# Patient Record
Sex: MALE | Race: WHITE | Employment: OTHER | URBAN - METROPOLITAN AREA
[De-identification: names, ages, dates, MRNs, and addresses within clinical notes are randomized per-mention and may not be internally consistent; named-entity substitution may affect disease eponyms.]

---

## 2018-02-22 ENCOUNTER — HOSPITAL ENCOUNTER (OUTPATIENT)
Dept: PREADMISSION TESTING | Age: 78
Discharge: HOME | End: 2018-02-22
Payer: MEDICARE

## 2018-02-22 VITALS
OXYGEN SATURATION: 100 % | SYSTOLIC BLOOD PRESSURE: 156 MMHG | HEIGHT: 71 IN | BODY MASS INDEX: 22.12 KG/M2 | TEMPERATURE: 97.8 F | DIASTOLIC BLOOD PRESSURE: 70 MMHG | HEART RATE: 58 BPM | WEIGHT: 158 LBS

## 2018-02-22 LAB
ABO + RH BLD: NORMAL
BLOOD GROUP ANTIBODIES SERPL: NORMAL
SPECIMEN EXP DATE BLD: NORMAL

## 2018-02-22 PROCEDURE — 36415 COLL VENOUS BLD VENIPUNCTURE: CPT | Performed by: ORTHOPAEDIC SURGERY

## 2018-02-22 PROCEDURE — 86900 BLOOD TYPING SEROLOGIC ABO: CPT | Performed by: ORTHOPAEDIC SURGERY

## 2018-02-22 RX ORDER — PRAVASTATIN SODIUM 40 MG/1
20 TABLET ORAL DAILY
COMMUNITY

## 2018-02-22 RX ORDER — ASPIRIN 325 MG
325 TABLET ORAL DAILY
COMMUNITY
End: 2018-03-02

## 2018-02-22 RX ORDER — HYDROCODONE BITARTRATE AND ACETAMINOPHEN 10; 325 MG/1; MG/1
1 TABLET ORAL
COMMUNITY
End: 2018-03-02

## 2018-02-22 RX ORDER — CYANOCOBALAMIN 1000 UG/ML
1000 INJECTION, SOLUTION INTRAMUSCULAR; SUBCUTANEOUS
COMMUNITY

## 2018-02-22 RX ORDER — AMLODIPINE BESYLATE 5 MG/1
5 TABLET ORAL DAILY
COMMUNITY

## 2018-02-22 RX ORDER — TAMSULOSIN HYDROCHLORIDE 0.4 MG/1
0.4 CAPSULE ORAL AS NEEDED
COMMUNITY

## 2018-02-22 RX ORDER — CLOPIDOGREL BISULFATE 75 MG/1
75 TABLET ORAL DAILY
COMMUNITY

## 2018-02-22 RX ORDER — ALLOPURINOL 300 MG/1
300 TABLET ORAL DAILY
COMMUNITY

## 2018-02-23 LAB
BACTERIA SPEC CULT: ABNORMAL
BACTERIA SPEC CULT: ABNORMAL
SERVICE CMNT-IMP: ABNORMAL

## 2018-02-26 PROBLEM — Z22.321 MSSA (METHICILLIN-SUSCEPTIBLE STAPH AUREUS) CARRIER: Status: ACTIVE | Noted: 2018-02-26

## 2018-02-26 RX ORDER — MUPIROCIN 20 MG/G
OINTMENT TOPICAL 2 TIMES DAILY
Qty: 22 G | Refills: 0 | Status: SHIPPED | OUTPATIENT
Start: 2018-02-26 | End: 2018-03-02

## 2018-02-26 NOTE — PERIOP NOTES
FAXED AND CALLED (JEFFY) KYRA--DR SYED RE: NARES CULTURE = APPARENT STAPH AUREUS NOT MRSA. CHART FORWARDED TO ALEJANDRA NUÑEZ NP FOR FURTHER TREATMENT PER ORTHO PROTOCOL.

## 2018-02-26 NOTE — ADVANCED PRACTICE NURSE
Patient was called (identity confirmed with 2 patient identifiers) and informed of positive MSSA, instructed to obtain mupirocin prescription and Chlorhexidine liquid soap from pharmacy per protocol. Written instructions given at time of Preadmission Testing were reinforced with patient. Patient was given an opportunity to have questions answered and contact information if needed.

## 2018-02-27 ENCOUNTER — ANESTHESIA EVENT (OUTPATIENT)
Dept: SURGERY | Age: 78
DRG: 470 | End: 2018-02-27
Payer: MEDICARE

## 2018-02-27 RX ORDER — DEXAMETHASONE SODIUM PHOSPHATE 10 MG/ML
4 INJECTION INTRAMUSCULAR; INTRAVENOUS ONCE
Status: CANCELLED | OUTPATIENT
Start: 2018-02-27 | End: 2018-02-28

## 2018-02-27 RX ORDER — ACETAMINOPHEN 500 MG
1000 TABLET ORAL ONCE
Status: CANCELLED | OUTPATIENT
Start: 2018-02-27 | End: 2018-02-27

## 2018-02-27 RX ORDER — CELECOXIB 200 MG/1
200 CAPSULE ORAL ONCE
Status: CANCELLED | OUTPATIENT
Start: 2018-02-27 | End: 2018-02-27

## 2018-02-27 RX ORDER — CEFAZOLIN SODIUM/WATER 2 G/20 ML
2 SYRINGE (ML) INTRAVENOUS ONCE
Status: CANCELLED | OUTPATIENT
Start: 2018-02-27 | End: 2018-02-27

## 2018-02-28 ENCOUNTER — HOSPITAL ENCOUNTER (INPATIENT)
Age: 78
LOS: 2 days | Discharge: HOME HEALTH | DRG: 470 | End: 2018-03-02
Attending: ORTHOPAEDIC SURGERY | Admitting: ORTHOPAEDIC SURGERY
Payer: MEDICARE

## 2018-02-28 ENCOUNTER — ANESTHESIA (OUTPATIENT)
Dept: SURGERY | Age: 78
DRG: 470 | End: 2018-02-28
Payer: MEDICARE

## 2018-02-28 DIAGNOSIS — M16.12 PRIMARY LOCALIZED OSTEOARTHRITIS OF LEFT HIP: Primary | ICD-10-CM

## 2018-02-28 LAB
GLUCOSE BLD STRIP.AUTO-MCNC: 88 MG/DL (ref 65–100)
SERVICE CMNT-IMP: NORMAL

## 2018-02-28 PROCEDURE — 74011000250 HC RX REV CODE- 250

## 2018-02-28 PROCEDURE — 77030034850: Performed by: ORTHOPAEDIC SURGERY

## 2018-02-28 PROCEDURE — 77030018836 HC SOL IRR NACL ICUM -A: Performed by: ORTHOPAEDIC SURGERY

## 2018-02-28 PROCEDURE — 77030018074 HC RTVR SUT ARTH4 S&N -B: Performed by: ORTHOPAEDIC SURGERY

## 2018-02-28 PROCEDURE — 74011250636 HC RX REV CODE- 250/636

## 2018-02-28 PROCEDURE — 77030032490 HC SLV COMPR SCD KNE COVD -B

## 2018-02-28 PROCEDURE — C1776 JOINT DEVICE (IMPLANTABLE): HCPCS | Performed by: ORTHOPAEDIC SURGERY

## 2018-02-28 PROCEDURE — 77030011640 HC PAD GRND REM COVD -A: Performed by: ORTHOPAEDIC SURGERY

## 2018-02-28 PROCEDURE — 77030018846 HC SOL IRR STRL H20 ICUM -A: Performed by: ORTHOPAEDIC SURGERY

## 2018-02-28 PROCEDURE — 77030007866 HC KT SPN ANES BBMI -B: Performed by: ANESTHESIOLOGY

## 2018-02-28 PROCEDURE — 77030031139 HC SUT VCRL2 J&J -A: Performed by: ORTHOPAEDIC SURGERY

## 2018-02-28 PROCEDURE — 65270000029 HC RM PRIVATE

## 2018-02-28 PROCEDURE — 74011000258 HC RX REV CODE- 258: Performed by: ORTHOPAEDIC SURGERY

## 2018-02-28 PROCEDURE — C9290 INJ, BUPIVACAINE LIPOSOME: HCPCS | Performed by: ORTHOPAEDIC SURGERY

## 2018-02-28 PROCEDURE — 82962 GLUCOSE BLOOD TEST: CPT

## 2018-02-28 PROCEDURE — 77030020788: Performed by: ORTHOPAEDIC SURGERY

## 2018-02-28 PROCEDURE — 77030027138 HC INCENT SPIROMETER -A

## 2018-02-28 PROCEDURE — 76060000036 HC ANESTHESIA 2.5 TO 3 HR: Performed by: ORTHOPAEDIC SURGERY

## 2018-02-28 PROCEDURE — 77030012935 HC DRSG AQUACEL BMS -B: Performed by: ORTHOPAEDIC SURGERY

## 2018-02-28 PROCEDURE — 77030003882 HC BIT DRL TWST BRSM -B: Performed by: ORTHOPAEDIC SURGERY

## 2018-02-28 PROCEDURE — 0SRB03A REPLACEMENT OF LEFT HIP JOINT WITH CERAMIC SYNTHETIC SUBSTITUTE, UNCEMENTED, OPEN APPROACH: ICD-10-PCS | Performed by: ORTHOPAEDIC SURGERY

## 2018-02-28 PROCEDURE — 77030010507 HC ADH SKN DERMBND J&J -B: Performed by: ORTHOPAEDIC SURGERY

## 2018-02-28 PROCEDURE — 77030018547 HC SUT ETHBND1 J&J -B: Performed by: ORTHOPAEDIC SURGERY

## 2018-02-28 PROCEDURE — 77030020365 HC SOL INJ SOD CL 0.9% 50ML: Performed by: ORTHOPAEDIC SURGERY

## 2018-02-28 PROCEDURE — 74011250637 HC RX REV CODE- 250/637: Performed by: ORTHOPAEDIC SURGERY

## 2018-02-28 PROCEDURE — 74011250636 HC RX REV CODE- 250/636: Performed by: ORTHOPAEDIC SURGERY

## 2018-02-28 PROCEDURE — 77030002933 HC SUT MCRYL J&J -A: Performed by: ORTHOPAEDIC SURGERY

## 2018-02-28 PROCEDURE — 74011250636 HC RX REV CODE- 250/636: Performed by: ANESTHESIOLOGY

## 2018-02-28 PROCEDURE — 77030006835 HC BLD SAW SAG STRY -B: Performed by: ORTHOPAEDIC SURGERY

## 2018-02-28 PROCEDURE — 76210000006 HC OR PH I REC 0.5 TO 1 HR: Performed by: ORTHOPAEDIC SURGERY

## 2018-02-28 PROCEDURE — 74011000250 HC RX REV CODE- 250: Performed by: ORTHOPAEDIC SURGERY

## 2018-02-28 PROCEDURE — 76010000172 HC OR TIME 2.5 TO 3 HR INTENSV-TIER 1: Performed by: ORTHOPAEDIC SURGERY

## 2018-02-28 DEVICE — LINER ACET OD58MM ID36MM HIP ALTRX PINN: Type: IMPLANTABLE DEVICE | Site: HIP | Status: FUNCTIONAL

## 2018-02-28 DEVICE — STEM FEMORAL SUMMIT: Type: IMPLANTABLE DEVICE | Site: HIP | Status: FUNCTIONAL

## 2018-02-28 DEVICE — CUP ACET DIA58MM HIP GRIPTION PRI CEMENTLESS FIX SECT SER: Type: IMPLANTABLE DEVICE | Site: HIP | Status: FUNCTIONAL

## 2018-02-28 DEVICE — COMPONENT TOT HIP PRIMARY CERM ALTRX: Type: IMPLANTABLE DEVICE | Status: FUNCTIONAL

## 2018-02-28 DEVICE — SCREW BNE L40MM DIA6.5MM CANC HIP DOME PINN: Type: IMPLANTABLE DEVICE | Site: HIP | Status: FUNCTIONAL

## 2018-02-28 DEVICE — HEAD FEM DIA36MM +5MM OFFSET 12/14 TAPR HIP CERAMIC BIOLOX: Type: IMPLANTABLE DEVICE | Site: HIP | Status: FUNCTIONAL

## 2018-02-28 RX ORDER — ALLOPURINOL 100 MG/1
300 TABLET ORAL DAILY
Status: DISCONTINUED | OUTPATIENT
Start: 2018-03-01 | End: 2018-03-02 | Stop reason: HOSPADM

## 2018-02-28 RX ORDER — TAMSULOSIN HYDROCHLORIDE 0.4 MG/1
0.4 CAPSULE ORAL DAILY
Status: DISCONTINUED | OUTPATIENT
Start: 2018-03-01 | End: 2018-03-02 | Stop reason: HOSPADM

## 2018-02-28 RX ORDER — AMOXICILLIN 250 MG
1 CAPSULE ORAL 2 TIMES DAILY
Status: DISCONTINUED | OUTPATIENT
Start: 2018-02-28 | End: 2018-03-02 | Stop reason: HOSPADM

## 2018-02-28 RX ORDER — SODIUM CHLORIDE 0.9 % (FLUSH) 0.9 %
5-10 SYRINGE (ML) INJECTION AS NEEDED
Status: DISCONTINUED | OUTPATIENT
Start: 2018-02-28 | End: 2018-02-28 | Stop reason: HOSPADM

## 2018-02-28 RX ORDER — CELECOXIB 200 MG/1
200 CAPSULE ORAL ONCE
Status: COMPLETED | OUTPATIENT
Start: 2018-02-28 | End: 2018-02-28

## 2018-02-28 RX ORDER — FACIAL-BODY WIPES
10 EACH TOPICAL DAILY PRN
Status: DISCONTINUED | OUTPATIENT
Start: 2018-03-02 | End: 2018-03-02 | Stop reason: HOSPADM

## 2018-02-28 RX ORDER — DEXAMETHASONE SODIUM PHOSPHATE 10 MG/ML
4 INJECTION INTRAMUSCULAR; INTRAVENOUS ONCE
Status: DISCONTINUED | OUTPATIENT
Start: 2018-02-28 | End: 2018-02-28 | Stop reason: HOSPADM

## 2018-02-28 RX ORDER — DIPHENHYDRAMINE HYDROCHLORIDE 50 MG/ML
12.5 INJECTION, SOLUTION INTRAMUSCULAR; INTRAVENOUS AS NEEDED
Status: DISCONTINUED | OUTPATIENT
Start: 2018-02-28 | End: 2018-02-28 | Stop reason: HOSPADM

## 2018-02-28 RX ORDER — SODIUM CHLORIDE 0.9 % (FLUSH) 0.9 %
5-10 SYRINGE (ML) INJECTION EVERY 8 HOURS
Status: DISCONTINUED | OUTPATIENT
Start: 2018-03-01 | End: 2018-03-02 | Stop reason: HOSPADM

## 2018-02-28 RX ORDER — DEXMEDETOMIDINE HYDROCHLORIDE 4 UG/ML
INJECTION, SOLUTION INTRAVENOUS AS NEEDED
Status: DISCONTINUED | OUTPATIENT
Start: 2018-02-28 | End: 2018-02-28 | Stop reason: HOSPADM

## 2018-02-28 RX ORDER — CEFAZOLIN SODIUM/WATER 2 G/20 ML
2 SYRINGE (ML) INTRAVENOUS ONCE
Status: DISCONTINUED | OUTPATIENT
Start: 2018-02-28 | End: 2018-02-28 | Stop reason: HOSPADM

## 2018-02-28 RX ORDER — AMLODIPINE BESYLATE 5 MG/1
5 TABLET ORAL DAILY
Status: DISCONTINUED | OUTPATIENT
Start: 2018-03-01 | End: 2018-03-02 | Stop reason: HOSPADM

## 2018-02-28 RX ORDER — PROPOFOL 10 MG/ML
INJECTION, EMULSION INTRAVENOUS
Status: DISCONTINUED | OUTPATIENT
Start: 2018-02-28 | End: 2018-02-28 | Stop reason: HOSPADM

## 2018-02-28 RX ORDER — MIDAZOLAM HYDROCHLORIDE 1 MG/ML
1 INJECTION, SOLUTION INTRAMUSCULAR; INTRAVENOUS AS NEEDED
Status: DISCONTINUED | OUTPATIENT
Start: 2018-02-28 | End: 2018-02-28 | Stop reason: HOSPADM

## 2018-02-28 RX ORDER — CEFAZOLIN SODIUM IN 0.9 % NACL 2 G/100 ML
PLASTIC BAG, INJECTION (ML) INTRAVENOUS AS NEEDED
Status: DISCONTINUED | OUTPATIENT
Start: 2018-02-28 | End: 2018-02-28 | Stop reason: HOSPADM

## 2018-02-28 RX ORDER — ASPIRIN 325 MG
325 TABLET, DELAYED RELEASE (ENTERIC COATED) ORAL 2 TIMES DAILY
Status: DISCONTINUED | OUTPATIENT
Start: 2018-02-28 | End: 2018-03-02 | Stop reason: HOSPADM

## 2018-02-28 RX ORDER — HYDROXYZINE HYDROCHLORIDE 10 MG/1
10 TABLET, FILM COATED ORAL
Status: DISCONTINUED | OUTPATIENT
Start: 2018-02-28 | End: 2018-03-02 | Stop reason: HOSPADM

## 2018-02-28 RX ORDER — MIDAZOLAM HYDROCHLORIDE 1 MG/ML
INJECTION, SOLUTION INTRAMUSCULAR; INTRAVENOUS AS NEEDED
Status: DISCONTINUED | OUTPATIENT
Start: 2018-02-28 | End: 2018-02-28 | Stop reason: HOSPADM

## 2018-02-28 RX ORDER — PRAVASTATIN SODIUM 20 MG/1
20 TABLET ORAL
Status: DISCONTINUED | OUTPATIENT
Start: 2018-03-01 | End: 2018-03-02 | Stop reason: HOSPADM

## 2018-02-28 RX ORDER — DEXAMETHASONE SODIUM PHOSPHATE 4 MG/ML
INJECTION, SOLUTION INTRA-ARTICULAR; INTRALESIONAL; INTRAMUSCULAR; INTRAVENOUS; SOFT TISSUE AS NEEDED
Status: DISCONTINUED | OUTPATIENT
Start: 2018-02-28 | End: 2018-02-28 | Stop reason: HOSPADM

## 2018-02-28 RX ORDER — SODIUM CHLORIDE, SODIUM LACTATE, POTASSIUM CHLORIDE, CALCIUM CHLORIDE 600; 310; 30; 20 MG/100ML; MG/100ML; MG/100ML; MG/100ML
100 INJECTION, SOLUTION INTRAVENOUS CONTINUOUS
Status: DISCONTINUED | OUTPATIENT
Start: 2018-02-28 | End: 2018-02-28 | Stop reason: HOSPADM

## 2018-02-28 RX ORDER — ACETAMINOPHEN 500 MG
1000 TABLET ORAL ONCE
Status: COMPLETED | OUTPATIENT
Start: 2018-02-28 | End: 2018-02-28

## 2018-02-28 RX ORDER — HYDROMORPHONE HYDROCHLORIDE 1 MG/ML
0.2 INJECTION, SOLUTION INTRAMUSCULAR; INTRAVENOUS; SUBCUTANEOUS
Status: DISCONTINUED | OUTPATIENT
Start: 2018-02-28 | End: 2018-02-28 | Stop reason: HOSPADM

## 2018-02-28 RX ORDER — TRANEXAMIC ACID 100 MG/ML
2 INJECTION, SOLUTION INTRAVENOUS ONCE
Status: COMPLETED | OUTPATIENT
Start: 2018-02-28 | End: 2018-02-28

## 2018-02-28 RX ORDER — HYDROCODONE BITARTRATE AND ACETAMINOPHEN 7.5; 325 MG/1; MG/1
1 TABLET ORAL
Status: DISCONTINUED | OUTPATIENT
Start: 2018-02-28 | End: 2018-03-01

## 2018-02-28 RX ORDER — NALOXONE HYDROCHLORIDE 0.4 MG/ML
0.4 INJECTION, SOLUTION INTRAMUSCULAR; INTRAVENOUS; SUBCUTANEOUS AS NEEDED
Status: DISCONTINUED | OUTPATIENT
Start: 2018-02-28 | End: 2018-03-02 | Stop reason: HOSPADM

## 2018-02-28 RX ORDER — FENTANYL CITRATE 50 UG/ML
50 INJECTION, SOLUTION INTRAMUSCULAR; INTRAVENOUS AS NEEDED
Status: DISCONTINUED | OUTPATIENT
Start: 2018-02-28 | End: 2018-02-28 | Stop reason: HOSPADM

## 2018-02-28 RX ORDER — MORPHINE SULFATE 10 MG/ML
2 INJECTION, SOLUTION INTRAMUSCULAR; INTRAVENOUS
Status: DISCONTINUED | OUTPATIENT
Start: 2018-02-28 | End: 2018-02-28 | Stop reason: HOSPADM

## 2018-02-28 RX ORDER — SODIUM CHLORIDE 9 MG/ML
125 INJECTION, SOLUTION INTRAVENOUS CONTINUOUS
Status: DISPENSED | OUTPATIENT
Start: 2018-02-28 | End: 2018-03-01

## 2018-02-28 RX ORDER — SODIUM CHLORIDE 0.9 % (FLUSH) 0.9 %
5-10 SYRINGE (ML) INJECTION AS NEEDED
Status: DISCONTINUED | OUTPATIENT
Start: 2018-02-28 | End: 2018-03-02 | Stop reason: HOSPADM

## 2018-02-28 RX ORDER — BUPIVACAINE HYDROCHLORIDE 7.5 MG/ML
INJECTION, SOLUTION EPIDURAL; RETROBULBAR AS NEEDED
Status: DISCONTINUED | OUTPATIENT
Start: 2018-02-28 | End: 2018-02-28 | Stop reason: HOSPADM

## 2018-02-28 RX ORDER — MIDAZOLAM HYDROCHLORIDE 1 MG/ML
0.5 INJECTION, SOLUTION INTRAMUSCULAR; INTRAVENOUS
Status: DISCONTINUED | OUTPATIENT
Start: 2018-02-28 | End: 2018-02-28 | Stop reason: HOSPADM

## 2018-02-28 RX ORDER — HYDROMORPHONE HYDROCHLORIDE 1 MG/ML
0.5 INJECTION, SOLUTION INTRAMUSCULAR; INTRAVENOUS; SUBCUTANEOUS
Status: ACTIVE | OUTPATIENT
Start: 2018-02-28 | End: 2018-03-01

## 2018-02-28 RX ORDER — FENTANYL CITRATE 50 UG/ML
25 INJECTION, SOLUTION INTRAMUSCULAR; INTRAVENOUS
Status: DISCONTINUED | OUTPATIENT
Start: 2018-02-28 | End: 2018-02-28 | Stop reason: HOSPADM

## 2018-02-28 RX ORDER — CEFAZOLIN SODIUM/WATER 2 G/20 ML
2 SYRINGE (ML) INTRAVENOUS EVERY 8 HOURS
Status: COMPLETED | OUTPATIENT
Start: 2018-02-28 | End: 2018-03-01

## 2018-02-28 RX ORDER — ONDANSETRON 2 MG/ML
4 INJECTION INTRAMUSCULAR; INTRAVENOUS
Status: ACTIVE | OUTPATIENT
Start: 2018-02-28 | End: 2018-03-01

## 2018-02-28 RX ORDER — POLYETHYLENE GLYCOL 3350 17 G/17G
17 POWDER, FOR SOLUTION ORAL DAILY
Status: DISCONTINUED | OUTPATIENT
Start: 2018-03-01 | End: 2018-03-02 | Stop reason: HOSPADM

## 2018-02-28 RX ORDER — ROPIVACAINE HYDROCHLORIDE 5 MG/ML
150 INJECTION, SOLUTION EPIDURAL; INFILTRATION; PERINEURAL AS NEEDED
Status: DISCONTINUED | OUTPATIENT
Start: 2018-02-28 | End: 2018-02-28 | Stop reason: HOSPADM

## 2018-02-28 RX ORDER — ACETAMINOPHEN 500 MG
500 TABLET ORAL EVERY 4 HOURS
Status: DISCONTINUED | OUTPATIENT
Start: 2018-02-28 | End: 2018-03-02 | Stop reason: HOSPADM

## 2018-02-28 RX ORDER — CLOPIDOGREL BISULFATE 75 MG/1
75 TABLET ORAL DAILY
Status: DISCONTINUED | OUTPATIENT
Start: 2018-03-01 | End: 2018-03-02 | Stop reason: HOSPADM

## 2018-02-28 RX ORDER — HYDROCODONE BITARTRATE AND ACETAMINOPHEN 10; 325 MG/1; MG/1
1 TABLET ORAL
Status: DISCONTINUED | OUTPATIENT
Start: 2018-02-28 | End: 2018-03-01

## 2018-02-28 RX ORDER — GLYCOPYRROLATE 0.2 MG/ML
INJECTION INTRAMUSCULAR; INTRAVENOUS AS NEEDED
Status: DISCONTINUED | OUTPATIENT
Start: 2018-02-28 | End: 2018-02-28 | Stop reason: HOSPADM

## 2018-02-28 RX ORDER — SODIUM CHLORIDE 0.9 % (FLUSH) 0.9 %
5-10 SYRINGE (ML) INJECTION EVERY 8 HOURS
Status: DISCONTINUED | OUTPATIENT
Start: 2018-02-28 | End: 2018-02-28 | Stop reason: HOSPADM

## 2018-02-28 RX ORDER — LIDOCAINE HYDROCHLORIDE 10 MG/ML
0.1 INJECTION, SOLUTION EPIDURAL; INFILTRATION; INTRACAUDAL; PERINEURAL AS NEEDED
Status: DISCONTINUED | OUTPATIENT
Start: 2018-02-28 | End: 2018-02-28 | Stop reason: HOSPADM

## 2018-02-28 RX ORDER — PROPOFOL 10 MG/ML
INJECTION, EMULSION INTRAVENOUS AS NEEDED
Status: DISCONTINUED | OUTPATIENT
Start: 2018-02-28 | End: 2018-02-28 | Stop reason: HOSPADM

## 2018-02-28 RX ADMIN — ASPIRIN 325 MG: 325 TABLET, DELAYED RELEASE ORAL at 19:40

## 2018-02-28 RX ADMIN — PROPOFOL 40 MG: 10 INJECTION, EMULSION INTRAVENOUS at 14:43

## 2018-02-28 RX ADMIN — ACETAMINOPHEN 500 MG: 500 TABLET, FILM COATED ORAL at 23:34

## 2018-02-28 RX ADMIN — SODIUM CHLORIDE, POTASSIUM CHLORIDE, SODIUM LACTATE AND CALCIUM CHLORIDE: 600; 310; 30; 20 INJECTION, SOLUTION INTRAVENOUS at 14:04

## 2018-02-28 RX ADMIN — BUPIVACAINE HYDROCHLORIDE 14 MG: 7.5 INJECTION, SOLUTION EPIDURAL; RETROBULBAR at 14:20

## 2018-02-28 RX ADMIN — PROPOFOL 30 MG: 10 INJECTION, EMULSION INTRAVENOUS at 14:39

## 2018-02-28 RX ADMIN — ACETAMINOPHEN 1000 MG: 500 TABLET, FILM COATED ORAL at 13:36

## 2018-02-28 RX ADMIN — MIDAZOLAM HYDROCHLORIDE 1 MG: 1 INJECTION, SOLUTION INTRAMUSCULAR; INTRAVENOUS at 14:15

## 2018-02-28 RX ADMIN — GLYCOPYRROLATE 0.2 MG: 0.2 INJECTION INTRAMUSCULAR; INTRAVENOUS at 14:44

## 2018-02-28 RX ADMIN — CELECOXIB 200 MG: 200 CAPSULE ORAL at 13:36

## 2018-02-28 RX ADMIN — SODIUM CHLORIDE 125 ML/HR: 900 INJECTION, SOLUTION INTRAVENOUS at 23:34

## 2018-02-28 RX ADMIN — Medication 2 G: at 21:34

## 2018-02-28 RX ADMIN — SODIUM CHLORIDE, POTASSIUM CHLORIDE, SODIUM LACTATE AND CALCIUM CHLORIDE: 600; 310; 30; 20 INJECTION, SOLUTION INTRAVENOUS at 15:26

## 2018-02-28 RX ADMIN — DEXMEDETOMIDINE HYDROCHLORIDE 4 MCG: 4 INJECTION, SOLUTION INTRAVENOUS at 14:28

## 2018-02-28 RX ADMIN — Medication 2 G: at 14:22

## 2018-02-28 RX ADMIN — ACETAMINOPHEN 500 MG: 500 TABLET, FILM COATED ORAL at 19:40

## 2018-02-28 RX ADMIN — DEXMEDETOMIDINE HYDROCHLORIDE 4 MCG: 4 INJECTION, SOLUTION INTRAVENOUS at 14:37

## 2018-02-28 RX ADMIN — DEXAMETHASONE SODIUM PHOSPHATE 4 MG: 4 INJECTION, SOLUTION INTRA-ARTICULAR; INTRALESIONAL; INTRAMUSCULAR; INTRAVENOUS; SOFT TISSUE at 14:29

## 2018-02-28 RX ADMIN — PROPOFOL 30 MG: 10 INJECTION, EMULSION INTRAVENOUS at 14:37

## 2018-02-28 RX ADMIN — PROPOFOL 25 MCG/KG/MIN: 10 INJECTION, EMULSION INTRAVENOUS at 14:16

## 2018-02-28 RX ADMIN — MIDAZOLAM HYDROCHLORIDE 1 MG: 1 INJECTION, SOLUTION INTRAMUSCULAR; INTRAVENOUS at 14:24

## 2018-02-28 RX ADMIN — PROPOFOL 40 MG: 10 INJECTION, EMULSION INTRAVENOUS at 14:34

## 2018-02-28 RX ADMIN — DOCUSATE SODIUM AND SENNOSIDES 1 TABLET: 8.6; 5 TABLET, FILM COATED ORAL at 19:40

## 2018-02-28 NOTE — BRIEF OP NOTE
BRIEF OPERATIVE NOTE    Date of Procedure: 2/28/2018   Preoperative Diagnosis: PRIMARY LOCALIZED OSTEOARTHRITIS LEFT HIP  Postoperative Diagnosis: PRIMARY LOCALIZED OSTEOARTHRITIS LEFT HIP    Procedure(s):  LEFT TOTAL HIP ARTHROPLASTY    Surgeon(s) and Role:     * Raudel Weeks MD - Primary         Assistant Staff: None      Surgical Staff:  Circ-1: Sharon Moran RN  Circ-Relief: Greg Casas RN  Scrub Tech-1: Abdulaziz Pastrana  Surg Asst-1: Ga Mora; Jacinto Rosales  Surg Asst-2: Marietta Naidu  Event Time In   Incision Start 1459   Incision Close      Anesthesia: Spinal   Estimated Blood Loss: 250  Specimens: * No specimens in log *   Findings: severe djd   Complications: none  Implants:   Implant Name Type Inv.  Item Serial No.  Lot No. LRB No. Used Action   CUP ACET SECTOR GRIPTION 58MM -- TI - SN/A  CUP ACET SECTOR GRIPTION 58MM -- TI N/A [facility] [employer] [id number] Left 1 Implanted   LINER ACET PINN NEUT [id number] -- ALTRX - SN/A  LINER ACET PINN NEUT [id number] -- ALTRX N/A [facility] [employer] [id number] Left 1 Implanted   STEM FEM 12/14 TAPR 7 -- SUMMIT - SN/A  STEM FEM 12/14 TAPR 7 -- SUMMIT N/A [facility] [employer] [id number] Left 1 Implanted   SCR BNE ACET CANC PINN 6.5X40 -- SS - SN/A  SCR BNE ACET CANC PINN 6.5X40 -- SS N/A [facility] [employer] [id number] Left 1 Implanted   HEAD FEM S-ROM 36MM +5MM NK -- BIOLOX DELTA - SN/A   HEAD FEM S-ROM 36MM +5MM NK -- BIOLOX DELTA N/A [facility] [employer] [id number] Left 1 Implanted

## 2018-02-28 NOTE — PERIOP NOTES
TRANSFER - OUT REPORT:    Verbal report given to FLAKITO on Judith Dee  being transferred to [address] [place] [address] for routine progression of care       Report consisted of patients Situation, Background, Assessment and   Recommendations(SBAR). Time Pre op antibiotic given:1422  Anesthesia Stop time: 8267  Hogan Present on Transfer to floor:y  Order for Hogan on Chart:y  Discharge Prescriptions with Chart:n    Information from the following report(s) SBAR, Kardex, OR Summary, Procedure Summary, Intake/Output, MAR, Recent Results and Med Rec Status was reviewed with the receiving nurse. Opportunity for questions and clarification was provided. Is the patient on 02? NO       L/Min        Other     Is the patient on a monitor? NO    Is the nurse transporting with the patient? NO    Surgical Waiting Area notified of patient's transfer from PACU?  YES      The following personal items collected during your admission accompanied patient upon transfer:   Dental Appliance: Dental Appliances: Partials  Vision:    Hearing Aid:    Jewelry:    Clothing:    Other Valuables:    Valuables sent to safe:

## 2018-02-28 NOTE — IP AVS SNAPSHOT
[facility] [address] 
[phone] Patient: Lalit Cohen MRN: [id number] :1940 A check daniel indicates which time of day the medication should be taken. My Medications START taking these medications Instructions Each Dose to Equal  
 Morning Noon Evening Bedtime  
 acetaminophen 500 mg tablet Commonly known as:  TYLENOL Your last dose was: Your next dose is: Take 1 Tab by mouth every four (4) hours (while awake). Schedule for pain control over the next 7-14 days. Do not exceed 2500 mg in 24 hours. Obtain over the counter. Indications: Postoperative Hip Incisional Pain 500 mg  
    
   
   
   
  
 aspirin delayed-release 325 mg tablet Replaces:  aspirin 325 mg tablet Your last dose was: Your next dose is: Take 1 Tab by mouth two (2) times a day. Take either Enteric Coated or Delayed Release Aspirin. May obtain over-the-counter. Indications: Prevention of Blood Clots after Total Hip Replacement 325 mg HYDROcodone-acetaminophen 5-325 mg per tablet Commonly known as:  Rashida Fabianamage Replaces:  HYDROcodone-acetaminophen  mg tablet Your last dose was: Your next dose is: Take 0.5 Tabs by mouth every twelve (12) hours as needed for Pain. Max Daily Amount: 1 Tab. Indications: Severe Incisional Hip Pain  
 0.5 Tab CONTINUE taking these medications Instructions Each Dose to Equal  
 Morning Noon Evening Bedtime  
 allopurinol 300 mg tablet Commonly known as:  Kriss Segura Your last dose was: Your next dose is: Take 300 mg by mouth daily. 300 mg  
    
   
   
   
  
 amLODIPine 5 mg tablet Commonly known as:  Carolyn Reece Your last dose was: Your next dose is: Take 5 mg by mouth daily. 5 mg clopidogrel 75 mg Tab Commonly known as:  PLAVIX Your last dose was: Your next dose is: Take 75 mg by mouth daily. 75 mg  
    
   
   
   
  
 cyanocobalamin 1,000 mcg/mL injection Commonly known as:  VITAMIN B12 Your last dose was: Your next dose is:    
   
   
 1,000 mcg by IntraMUSCular route every thirty (30) days. 1000 mcg FLOMAX 0.4 mg capsule Generic drug:  tamsulosin Your last dose was: Your next dose is: Take 0.4 mg by mouth as needed. 0.4 mg  
    
   
   
   
  
 PRAVACHOL 40 mg tablet Generic drug:  pravastatin Your last dose was: Your next dose is: Take 20 mg by mouth daily. 20 mg  
    
   
   
   
  
  
STOP taking these medications   
 aspirin 325 mg tablet Commonly known as:  ASPIRIN Replaced by:  aspirin delayed-release 325 mg tablet HYDROcodone-acetaminophen  mg tablet Commonly known as:  Brian Fraction Replaced by:  HYDROcodone-acetaminophen 5-325 mg per tablet  
   
  
 mupirocin 2 % ointment Commonly known as:  [facility] Where to Get Your Medications Information on where to get these meds will be given to you by the nurse or doctor. ! Ask your nurse or doctor about these medications  
  acetaminophen 500 mg tablet  
 aspirin delayed-release 325 mg tablet HYDROcodone-acetaminophen 5-325 mg per tablet

## 2018-02-28 NOTE — IP AVS SNAPSHOT
Summary of Care Report The Summary of Care report has been created to help improve care coordination. Users with access to [employer] or [address] (Web-based application) may access additional patient information including the Discharge Summary. If you are not currently a [address] user and need more information, please call the number listed below in the [place] [address] section and ask to be connected with Medical Records. Facility Information Name Address Phone [address] [phone] Patient Information Patient Name Sex ABELARDO Cooper ([id number]) Male 1940 Discharge Information Admitting Provider Service Area Unit Fadi Tijerina MD / [facility] / [phone] Discharge Provider Discharge Date/Time Discharge Disposition Destination (none) 3/2/2018 (Pending) [facility] (none) Patient Language Language ENGLISH [13] Hospital Problems as of 3/2/2018  Reviewed: 2018  1:37 PM by Talisha Fulton MD  
  
  
  
 Class Noted - Resolved Last Modified POA Active Problems Primary localized osteoarthritis of left hip  2018 - Present 2018 by Fadi Tijerina MD Unknown Entered by Fadi Tijerina MD  
  
Non-Hospital Problems as of 3/2/2018  Reviewed: 2018  1:37 PM by Talisha Fulton MD  
  
  
  
 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  
  
START taking these medications Dose & Instructions Dispensing Information Comments  
 acetaminophen 500 mg tablet Commonly known as:  TYLENOL  Dose:  500 mg  
 Take 1 Tab by mouth every four (4) hours (while awake). Schedule for pain control over the next 7-14 days. Do not exceed 2500 mg in 24 hours. Obtain over the counter. Indications: Postoperative Hip Incisional Pain Quantity:  60 Tab Refills:  0  
   
 aspirin delayed-release 325 mg tablet Replaces:  aspirin 325 mg tablet Dose:  325 mg Take 1 Tab by mouth two (2) times a day. Take either Enteric Coated or Delayed Release Aspirin. May obtain over-the-counter. Indications: Prevention of Blood Clots after Total Hip Replacement Quantity:  60 Tab Refills:  0 HYDROcodone-acetaminophen 5-325 mg per tablet Commonly known as:  Natty Dozier Replaces:  HYDROcodone-acetaminophen  mg tablet Dose:  0.5 Tab Take 0.5 Tabs by mouth every twelve (12) hours as needed for Pain. Max Daily Amount: 1 Tab. Indications: Severe Incisional Hip Pain Quantity:  8 Tab Refills:  0 CONTINUE these medications which have NOT CHANGED Dose & Instructions Dispensing Information Comments  
 allopurinol 300 mg tablet Commonly known as:  Kristina Bita Dose:  300 mg Take 300 mg by mouth daily. Refills:  0  
   
 amLODIPine 5 mg tablet Commonly known as:  Tylene Brayden Dose:  5 mg Take 5 mg by mouth daily. Refills:  0  
   
 clopidogrel 75 mg Tab Commonly known as:  PLAVIX Dose:  75 mg Take 75 mg by mouth daily. Refills:  0  
   
 cyanocobalamin 1,000 mcg/mL injection Commonly known as:  VITAMIN B12 Dose:  1000 mcg  
1,000 mcg by IntraMUSCular route every thirty (30) days. Refills:  0  
   
 FLOMAX 0.4 mg capsule Generic drug:  tamsulosin Dose:  0.4 mg Take 0.4 mg by mouth as needed. Refills:  0 PRAVACHOL 40 mg tablet Generic drug:  pravastatin Dose:  20 mg Take 20 mg by mouth daily. Refills:  0 STOP taking these medications Comments  
 aspirin 325 mg tablet Commonly known as:  ASPIRIN  
 Replaced by:  aspirin delayed-release 325 mg tablet HYDROcodone-acetaminophen  mg tablet Commonly known as:  Carson Daring Replaced by:  HYDROcodone-acetaminophen 5-325 mg per tablet  
   
   
 mupirocin 2 % ointment Commonly known as:  [facility] Information ID Date/Time Status Primary Surgeon All Procedures Location [id number] 2/28/2018 Robby Florence MD LEFT TOTAL HIP ARTHROPLASTY   [facility] MAIN OR Follow-up Information Follow up With Details Comments Contact Info Sylvain Galvin On 3/5/2018 For home health physical therapy and skilled nursing. [phone] Magui Fang MD   Patient can only remember the practice name and not the physician Discharge Instructions Patient meets criteria for BUNDLED PAYMENT  
for Care Improvement Initiative Criteria Contact Information for Orthopedic Nurse Navigator:     
ANTIONETTE Clancy, RN-BC 
V:[phone] R:[phone] L:[phone] After Hospital Care Plan:  Discharge Instructions Hip Replacement- Dr. Lakeshia Ventura Patient Name: Jaden Mathis Date of procedure: 2/28/2018 Procedure: Procedure(s): LEFT TOTAL HIP ARTHROPLASTY Surgeon: Surgeon(s) and Role: Shaina Brock MD - Primary PCP: Magui Fang MD 
Date of discharge: No discharge date for patient encounter. Follow up appointments ? Follow up with Dr. Lakeshia Ventura in 4 weeks. Call [phone] to make an appointment. ? If home health has been arranged for you the agency will contact you to arrange dates/times for visits. Please call them if you do not hear from them within 24 hours after you are discharged When to call your Orthopaedic Surgeon: Call [phone]. If you need to reach us after 5pm or on a weekend; call [phone] and the on call physician will be contacted ? Increased hip pain, unrelieved pain or if you have difficulty or are unable to walk ? Signs of infection-if your incision is red; continues to have drainage; drainage has a foul odor or if you have a persistent fever over 101 degrees ? Signs of a blood clot in your leg-calf pain, tenderness, redness, swelling of lower leg When to call your Primary Care Physician: 
? Concerns about medical conditions such as diabetes, high blood pressure, asthma, congestive heart failure ? Call if blood sugars are elevated, persistent headache or dizziness, coughing or congestion, constipation or diarrhea, burning with urination, abnormal heart rate When to call 911and go to the nearest emergency room 
? acute onset of chest pain, shortness of breath, difficulty breathing Activity ? Weight bearing as tolerated with walker or crutches. Refer to pages 23-33 of your handbook for instructions and pictures ? Complete your Home Exercise Program daily as instructed by your therapist.  Refer to pages 36-42 of your handbook for instructions and pictures ? Get up every one hour and walk (except at night when sleeping) ? AVOID sudden and extreme movement of your hip (surgical leg) ? Do not drive or operate heavy machinery Incision Care ? The Aquacel (brown, waterproof) surgical dressing is to remain on your hip for 7 days. On the 7th day have someone gently peel the dressing off by carefully lifting the edge and stretching it slightly to break the adhesive seal 
? If you have steri-strips (small, white pieces of tape) on your incision, they may come off when you remove the Aquacel surgical dressing. This is okay. You may now leave your incision open to air ? If your Aquacel dressing comes loose/off before the 7th day, you may replace it with a dry sterile gauze dressing; change it daily. Once your incision is not draining, you may leave it open to air ? You may take a shower with the Aquacel dressing in place.   Once the Aquacel is removed, you may shower and get your incision wet but do not submerge your incision under water in a bath tub, hot tub or swimming pool for 6 weeks after surgery. Preventing blood clots ? Take Enteric coated Aspirin (delayed release) one tablet twice a day for one month following surgery Pain management ? Take pain medication as prescribed with food and fluids; decrease the amount you use as your pain lessens ? Avoid alcoholic beverages while taking pain medication ? Please be aware that many medications contain Tylenol. We do not want you to over medicate so please read the information below as a guide. Do not take more than 2.5 Grams of Tylenol in a 24 hour period. (There are 1000 milligrams in one Gram) o Tylenol 325 mg per tablet (do not take more than 8 tablets in 24 hours) o Tylenol 500 mg per tablet (do not take more than 5 tablets in 24 hours) ? You may place an ice bag on your hip for 15-20 minutes after exercising Diet ? Resume usual diet; drink plenty of fluids; eat foods high in fiber ? You may want to take a stool softener (such as Senokot-S or Colace) to prevent constipation while you are taking pain medication. If constipation occurs, take a laxative (such as Dulcolax tablets, Milk of Magnesia, or a suppository) Home Health Care Protocol ([facility] Nursing-per physicians order ? Complete head to toe assessment, vital signs ? Medication reconciliation ? Review pain management ? Manage chronic medical conditions Physical Therapy-per physicians order Weight bearing status: 
Precautions at Admission: Fall, WBAT Left Side Weight Bearing: As tolerated Right Side Weight Bearing: Full Mobility Status: 
Supine to Sit: Supervision, Additional time Sit to Stand: Contact guard assistance, Additional time, Assist x1 (bed slightly elevated) Sit to Supine:  (returned to chair after gait) Bed to Chair: Supervision Gait: 
Distance (ft): 200 Feet (ft) Ambulation - Level of Assistance: Stand-by assistance, Additional time, Assist x1 Assistive Device: Gait belt, Walker, rolling Gait Abnormalities: Decreased step clearance, Other (chronic inversion of left foot) ADL status overall composite: Toilet Transfer : Supervision Physical Therapy ? Assessment and evaluation-bed mobility; functional transfers (bed, chair, bathroom, stairs); ambulation with equipment, car transfers, safety and ability to get out of house in the event of an emergency ? AVOID sudden and extreme movement of the hip (surgical leg) ? Discuss pain management ? Review how to do ADLs. Refer to pages 43-47 of handbook Home Exercise Program-refer to pages 36-42 of handbook Chart Review Routing History No Routing History on File

## 2018-02-28 NOTE — ANESTHESIA PROCEDURE NOTES
Spinal Block    Performed by: Deanna Ricks  Authorized by: Deanna Ricks     Pre-procedure:   Indications: primary anesthetic  Preanesthetic Checklist: patient identified, risks and benefits discussed, anesthesia consent, site marked, patient being monitored and timeout performed      Spinal Block:   Patient Position:  Seated    Prep: Betadine      Location:  L3-4  Technique:  Single shot        Needle:   Needle Type:  Pencil-tip  Needle Gauge:  25 G  Attempts:  1      Events: CSF confirmed, no blood with aspiration and no paresthesia        Assessment:  Insertion:  Uncomplicated  Patient tolerance:  Patient tolerated the procedure well with no immediate complications  14 mg

## 2018-02-28 NOTE — IP AVS SNAPSHOT
[address] 
[phone] Patient: Tripp Pena MRN: [id number] :[id number] About your hospitalization You were admitted on:  2018 [facility]  2018 Why you were hospitalized Your primary diagnosis was:  Not on File Your diagnoses also included:  Primary Localized Osteoarthritis Of Left Hip Follow-up Information Follow up With Details Comments Contact Lora Sylvain Galvin On 3/5/2018 For home health physical therapy and skilled nursing. [phone] Magui Fang, MD   Patient can only remember the practice name and not the physician Discharge Orders None A check daniel indicates which time of day the medication should be taken. My Medications START taking these medications Instructions Each Dose to Equal  
 Morning Noon Evening Bedtime  
 acetaminophen 500 mg tablet Commonly known as:  TYLENOL Your last dose was: Your next dose is: Take 1 Tab by mouth every four (4) hours (while awake). Schedule for pain control over the next 7-14 days. Do not exceed 2500 mg in 24 hours. Obtain over the counter. Indications: Postoperative Hip Incisional Pain 500 mg  
    
   
   
   
  
 aspirin delayed-release 325 mg tablet Replaces:  aspirin 325 mg tablet Your last dose was: Your next dose is: Take 1 Tab by mouth two (2) times a day. Take either Enteric Coated or Delayed Release Aspirin. May obtain over-the-counter. Indications: Prevention of Blood Clots after Total Hip Replacement 325 mg HYDROcodone-acetaminophen 5-325 mg per tablet Commonly known as:  Natty Dozier Replaces:  HYDROcodone-acetaminophen  mg tablet Your last dose was: Your next dose is: Take 0.5 Tabs by mouth every twelve (12) hours as needed for Pain. Max Daily Amount: 1 Tab. Indications: Severe Incisional Hip Pain  
 0.5 Tab CONTINUE taking these medications Instructions Each Dose to Equal  
 Morning Noon Evening Bedtime  
 allopurinol 300 mg tablet Commonly known as:  Elinor Escalona Your last dose was: Your next dose is: Take 300 mg by mouth daily. 300 mg  
    
   
   
   
  
 amLODIPine 5 mg tablet Commonly known as:  Rich Velasquez Your last dose was: Your next dose is: Take 5 mg by mouth daily. 5 mg  
    
   
   
   
  
 clopidogrel 75 mg Tab Commonly known as:  PLAVIX Your last dose was: Your next dose is: Take 75 mg by mouth daily. 75 mg  
    
   
   
   
  
 cyanocobalamin 1,000 mcg/mL injection Commonly known as:  VITAMIN B12 Your last dose was: Your next dose is:    
   
   
 1,000 mcg by IntraMUSCular route every thirty (30) days. 1000 mcg FLOMAX 0.4 mg capsule Generic drug:  tamsulosin Your last dose was: Your next dose is: Take 0.4 mg by mouth as needed. 0.4 mg  
    
   
   
   
  
 PRAVACHOL 40 mg tablet Generic drug:  pravastatin Your last dose was: Your next dose is: Take 20 mg by mouth daily. 20 mg  
    
   
   
   
  
  
STOP taking these medications   
 aspirin 325 mg tablet Commonly known as:  ASPIRIN Replaced by:  aspirin delayed-release 325 mg tablet HYDROcodone-acetaminophen  mg tablet Commonly known as:  Maira Morales Replaced by:  HYDROcodone-acetaminophen 5-325 mg per tablet  
   
  
 mupirocin 2 % ointment Commonly known as:  [facility] Where to Get Your Medications Information on where to get these meds will be given to you by the nurse or doctor. ! Ask your nurse or doctor about these medications acetaminophen 500 mg tablet  
 aspirin delayed-release 325 mg tablet HYDROcodone-acetaminophen 5-325 mg per tablet Discharge Instructions Patient meets criteria for BUNDLED PAYMENT  
for Care Improvement Initiative Criteria Contact Information for Orthopedic Nurse Navigator:     
ANTIONETTE Martínez, RN-BC 
A:[phone] E:[phone] After Hospital Care Plan:  Discharge Instructions Hip Replacement- Dr. Alexander Portillo Patient Name: Carmen Kirby Date of procedure: 2018 Procedure: Procedure(s): LEFT TOTAL HIP ARTHROPLASTY Surgeon: Surgeon(s) and Role: Benjamin Leblanc MD - Primary PCP: Magui Fang MD 
Date of discharge: No discharge date for patient encounter. Follow up appointments ? Follow up with Dr. Alexander Portlilo in 4 weeks. Call [phone] to make an appointment. ? If home health has been arranged for you the agency will contact you to arrange dates/times for visits. Please call them if you do not hear from them within 24 hours after you are discharged When to call your Orthopaedic Surgeon: Call [phone]. If you need to reach us after 5pm or on a weekend; call [phone] and the on call physician will be contacted ? Increased hip pain, unrelieved pain or if you have difficulty or are unable to walk ? Signs of infection-if your incision is red; continues to have drainage; drainage has a foul odor or if you have a persistent fever over 101 degrees ? Signs of a blood clot in your leg-calf pain, tenderness, redness, swelling of lower leg When to call your Primary Care Physician: 
? Concerns about medical conditions such as diabetes, high blood pressure, asthma, congestive heart failure ? Call if blood sugars are elevated, persistent headache or dizziness, coughing or congestion, constipation or diarrhea, burning with urination, abnormal heart rate When to call 850low go to the nearest emergency room ? acute onset of chest pain, shortness of breath, difficulty breathing Activity ? Weight bearing as tolerated with walker or crutches. Refer to pages 23-33 of your handbook for instructions and pictures ? Complete your Home Exercise Program daily as instructed by your therapist.  Refer to pages 36-42 of your handbook for instructions and pictures ? Get up every one hour and walk (except at night when sleeping) ? AVOID sudden and extreme movement of your hip (surgical leg) ? Do not drive or operate heavy machinery Incision Care ? The Aquacel (brown, waterproof) surgical dressing is to remain on your hip for 7 days. On the 7th day have someone gently peel the dressing off by carefully lifting the edge and stretching it slightly to break the adhesive seal 
? If you have steri-strips (small, white pieces of tape) on your incision, they may come off when you remove the Aquacel surgical dressing. This is okay. You may now leave your incision open to air ? If your Aquacel dressing comes loose/off before the 7th day, you may replace it with a dry sterile gauze dressing; change it daily. Once your incision is not draining, you may leave it open to air ? You may take a shower with the Aquacel dressing in place. Once the Aquacel is removed, you may shower and get your incision wet but do not submerge your incision under water in a bath tub, hot tub or swimming pool for 6 weeks after surgery. Preventing blood clots ? Take Enteric coated Aspirin (delayed release) one tablet twice a day for one month following surgery Pain management ? Take pain medication as prescribed with food and fluids; decrease the amount you use as your pain lessens ? Avoid alcoholic beverages while taking pain medication ? Please be aware that many medications contain Tylenol. We do not want you to over medicate so please read the information below as a guide.   Do not take more than 2.5 Grams of Tylenol in a 24 hour period. (There are 1000 milligrams in one Gram) o Tylenol 325 mg per tablet (do not take more than 8 tablets in 24 hours) o Tylenol 500 mg per tablet (do not take more than 5 tablets in 24 hours) ? You may place an ice bag on your hip for 15-20 minutes after exercising Diet ? Resume usual diet; drink plenty of fluids; eat foods high in fiber ? You may want to take a stool softener (such as Senokot-S or Colace) to prevent constipation while you are taking pain medication. If constipation occurs, take a laxative (such as Dulcolax tablets, Milk of Magnesia, or a suppository) Home Health Care Protocol (to be followed by [address]) Nursing-per physicians order ? Complete head to toe assessment, vital signs ? Medication reconciliation ? Review pain management ? Manage chronic medical conditions Physical Therapy-per physicians order Weight bearing status: 
Precautions at Admission: Fall, WBAT Left Side Weight Bearing: As tolerated Right Side Weight Bearing: Full Mobility Status: 
Supine to Sit: Supervision, Additional time Sit to Stand: Contact guard assistance, Additional time, Assist x1 (bed slightly elevated) Sit to Supine:  (returned to chair after gait) Bed to Chair: Supervision Gait: 
Distance (ft): 200 Feet (ft) Ambulation - Level of Assistance: Stand-by assistance, Additional time, Assist x1 Assistive Device: Gait belt, Walker, rolling Gait Abnormalities: Decreased step clearance, Other (chronic inversion of left foot) ADL status overall composite: Toilet Transfer : Supervision Physical Therapy ? Assessment and evaluation-bed mobility; functional transfers (bed, chair, bathroom, stairs); ambulation with equipment, car transfers, safety and ability to get out of house in the event of an emergency ? AVOID sudden and extreme movement of the hip (surgical leg) ? Discuss pain management ? Review how to do ADLs. Refer to pages 43-47 of handbook Home Exercise Program-refer to pages 36-42 of handbook Introducing [place]! Candis Carrillo introduces [employer] patient portal. Now you can access parts of your medical record, email your doctor's office, and request medication refills online. 1. In your internet browser, go to [email or web address] 2. Click on the First Time User? Click Here link in the Sign In box. You will see the New Member Sign Up page. 3. Enter your [employer] Access Code exactly as it appears below. You will not need to use this code after youve completed the sign-up process. If you do not sign up before the expiration date, you must request a new code. · [employer] Access Code: [email or web address] Expires: 5/23/2018  8:15 AM 
 
4. Enter the last four digits of your Social Security Number (xxxx) and Date of Birth (mm/dd/yyyy) as indicated and click Submit. You will be taken to the next sign-up page. 5. Create a [employer] ID. This will be your [employer] login ID and cannot be changed, so think of one that is secure and easy to remember. 6. Create a [employer] password. You can change your password at any time. 7. Enter your Password Reset Question and Answer. This can be used at a later time if you forget your password. 8. Enter your e-mail address. You will receive e-mail notification when new information is available in [address] 9. Click Sign Up. You can now view and download portions of your medical record. 10. Click the Download Summary menu link to download a portable copy of your medical information. If you have questions, please visit the Frequently Asked Questions section of the [employer] website. Remember, [employer] is NOT to be used for urgent needs. For medical emergencies, dial 911. Now available from your iPhone and Android! Providers Seen During Your Hospitalization Provider Specialty Primary office phone Jac Henderson MD Orthopedic Surgery [phone] Your Primary Care Physician (PCP) Primary Care Physician Office Phone Office Fax OTHER, PHYS ** None ** ** None ** You are allergic to the following No active allergies Recent Documentation Height Weight BMI Smoking Status 1.803 m 72.1 kg 22.17 kg/m2 Current Some Day Smoker Emergency Contacts Name Discharge Info Relation Home Work Mobile Belen Sutherland CAREGIVER [3] Friend [5] [phone] Patient Belongings The following personal items are in your possession at time of discharge: 
  Dental Appliances: Partials  Visual Aid: Glasses Please provide this summary of care documentation to your next provider. Signatures-by signing, you are acknowledging that this After Visit Summary has been reviewed with you and you have received a copy. Patient Signature:  ____________________________________________________________ Date:  ____________________________________________________________  
  
Deo Motta Provider Signature:  ____________________________________________________________ Date:  ____________________________________________________________

## 2018-02-28 NOTE — PROGRESS NOTES
Problem: Falls - Risk of  Goal: *Absence of Falls  Document Karlene Fall Risk and appropriate interventions in the flowsheet.    Outcome: Progressing Towards Goal  Fall Risk Interventions:  Mobility Interventions: Utilize gait belt for transfers/ambulation, Utilize walker, cane, or other assitive device, Strengthening exercises (ROM-active/passive), PT Consult for assist device competence, PT Consult for mobility concerns, OT consult for ADLs, Patient to call before getting OOB, Communicate number of staff needed for ambulation/transfer         Medication Interventions: Utilize gait belt for transfers/ambulation, Teach patient to arise slowly, Patient to call before getting OOB    Elimination Interventions: Urinal in reach, Toileting schedule/hourly rounds, Toilet paper/wipes in reach, Patient to call for help with toileting needs, Elevated toilet seat, Call light in reach (currently has a fuentes )

## 2018-02-28 NOTE — ANESTHESIA PREPROCEDURE EVALUATION
Anesthetic History   No history of anesthetic complications            Review of Systems / Medical History  Patient summary reviewed, nursing notes reviewed and pertinent labs reviewed    Pulmonary  Within defined limits                 Neuro/Psych   Within defined limits           Cardiovascular    Hypertension          CAD         GI/Hepatic/Renal  Within defined limits              Endo/Other  Within defined limits           Other Findings              Physical Exam    Airway  Mallampati: II  TM Distance: > 6 cm  Neck ROM: normal range of motion   Mouth opening: Normal     Cardiovascular  Regular rate and rhythm,  S1 and S2 normal,  no murmur, click, rub, or gallop             Dental  No notable dental hx       Pulmonary  Breath sounds clear to auscultation               Abdominal  GI exam deferred       Other Findings            Anesthetic Plan    ASA: 2  Anesthesia type: spinal          Induction: Intravenous  Anesthetic plan and risks discussed with: Patient      No txa

## 2018-03-01 ENCOUNTER — APPOINTMENT (OUTPATIENT)
Dept: GENERAL RADIOLOGY | Age: 78
DRG: 470 | End: 2018-03-01
Attending: ORTHOPAEDIC SURGERY
Payer: MEDICARE

## 2018-03-01 LAB
ANION GAP SERPL CALC-SCNC: 8 MMOL/L (ref 5–15)
BUN SERPL-MCNC: 18 MG/DL (ref 6–20)
BUN/CREAT SERPL: 25 (ref 12–20)
CALCIUM SERPL-MCNC: 8.4 MG/DL (ref 8.5–10.1)
CHLORIDE SERPL-SCNC: 109 MMOL/L (ref 97–108)
CO2 SERPL-SCNC: 24 MMOL/L (ref 21–32)
CREAT SERPL-MCNC: 0.73 MG/DL (ref 0.7–1.3)
GLUCOSE SERPL-MCNC: 131 MG/DL (ref 65–100)
HGB BLD-MCNC: 8.9 G/DL (ref 12.1–17)
POTASSIUM SERPL-SCNC: 4.3 MMOL/L (ref 3.5–5.1)
SODIUM SERPL-SCNC: 141 MMOL/L (ref 136–145)

## 2018-03-01 PROCEDURE — 65270000029 HC RM PRIVATE

## 2018-03-01 PROCEDURE — 36415 COLL VENOUS BLD VENIPUNCTURE: CPT | Performed by: ORTHOPAEDIC SURGERY

## 2018-03-01 PROCEDURE — 74011250636 HC RX REV CODE- 250/636: Performed by: ORTHOPAEDIC SURGERY

## 2018-03-01 PROCEDURE — 85018 HEMOGLOBIN: CPT | Performed by: ORTHOPAEDIC SURGERY

## 2018-03-01 PROCEDURE — 74011250637 HC RX REV CODE- 250/637: Performed by: ORTHOPAEDIC SURGERY

## 2018-03-01 PROCEDURE — 97165 OT EVAL LOW COMPLEX 30 MIN: CPT

## 2018-03-01 PROCEDURE — G8988 SELF CARE GOAL STATUS: HCPCS

## 2018-03-01 PROCEDURE — 74011250637 HC RX REV CODE- 250/637: Performed by: NURSE PRACTITIONER

## 2018-03-01 PROCEDURE — 97161 PT EVAL LOW COMPLEX 20 MIN: CPT

## 2018-03-01 PROCEDURE — 73501 X-RAY EXAM HIP UNI 1 VIEW: CPT

## 2018-03-01 PROCEDURE — G8987 SELF CARE CURRENT STATUS: HCPCS

## 2018-03-01 PROCEDURE — 80048 BASIC METABOLIC PNL TOTAL CA: CPT | Performed by: ORTHOPAEDIC SURGERY

## 2018-03-01 PROCEDURE — 97535 SELF CARE MNGMENT TRAINING: CPT

## 2018-03-01 PROCEDURE — 97116 GAIT TRAINING THERAPY: CPT

## 2018-03-01 RX ORDER — HYDROCODONE BITARTRATE AND ACETAMINOPHEN 10; 325 MG/1; MG/1
0.5 TABLET ORAL
Status: DISCONTINUED | OUTPATIENT
Start: 2018-03-01 | End: 2018-03-02 | Stop reason: HOSPADM

## 2018-03-01 RX ADMIN — Medication 10 ML: at 06:51

## 2018-03-01 RX ADMIN — ALLOPURINOL 300 MG: 100 TABLET ORAL at 08:40

## 2018-03-01 RX ADMIN — Medication 10 ML: at 15:41

## 2018-03-01 RX ADMIN — DOCUSATE SODIUM AND SENNOSIDES 1 TABLET: 8.6; 5 TABLET, FILM COATED ORAL at 08:40

## 2018-03-01 RX ADMIN — ACETAMINOPHEN 500 MG: 500 TABLET, FILM COATED ORAL at 12:48

## 2018-03-01 RX ADMIN — ACETAMINOPHEN 500 MG: 500 TABLET, FILM COATED ORAL at 20:59

## 2018-03-01 RX ADMIN — ASPIRIN 325 MG: 325 TABLET, DELAYED RELEASE ORAL at 20:59

## 2018-03-01 RX ADMIN — ACETAMINOPHEN 500 MG: 500 TABLET, FILM COATED ORAL at 03:50

## 2018-03-01 RX ADMIN — ACETAMINOPHEN 500 MG: 500 TABLET, FILM COATED ORAL at 08:04

## 2018-03-01 RX ADMIN — ASPIRIN 325 MG: 325 TABLET, DELAYED RELEASE ORAL at 08:40

## 2018-03-01 RX ADMIN — Medication 10 ML: at 20:48

## 2018-03-01 RX ADMIN — Medication 2 G: at 06:51

## 2018-03-01 RX ADMIN — TAMSULOSIN HYDROCHLORIDE 0.4 MG: 0.4 CAPSULE ORAL at 08:40

## 2018-03-01 RX ADMIN — CLOPIDOGREL BISULFATE 75 MG: 75 TABLET ORAL at 08:40

## 2018-03-01 RX ADMIN — ACETAMINOPHEN 500 MG: 500 TABLET, FILM COATED ORAL at 16:10

## 2018-03-01 RX ADMIN — HYDROCODONE BITARTRATE AND ACETAMINOPHEN 0.5 TABLET: 10; 325 TABLET ORAL at 16:18

## 2018-03-01 RX ADMIN — DOCUSATE SODIUM AND SENNOSIDES 1 TABLET: 8.6; 5 TABLET, FILM COATED ORAL at 17:56

## 2018-03-01 RX ADMIN — PRAVASTATIN SODIUM 20 MG: 20 TABLET ORAL at 21:03

## 2018-03-01 RX ADMIN — SODIUM CHLORIDE 125 ML/HR: 900 INJECTION, SOLUTION INTRAVENOUS at 06:59

## 2018-03-01 RX ADMIN — POLYETHYLENE GLYCOL 3350 17 G: 17 POWDER, FOR SOLUTION ORAL at 08:40

## 2018-03-01 NOTE — PROGRESS NOTES
Problem: Mobility Impaired (Adult and Pediatric)  Goal: *Acute Goals and Plan of Care (Insert Text)  Physical Therapy Goals  Initiated 3/1/2018    1. Patient will move from supine to sit and sit to supine  in bed with modified independence within 4 days. 2. Patient will perform sit to stand with modified independence within 4 days. 3. Patient will ambulate with modified independence for 300 feet with the least restrictive device within 4 days. 4. Patient will ascend/descend 5 stairs with 1 handrail(s) with modified independence within 4 days. 5. Patient will perform HENRIETTA home exercise program per protocol with modified independence within 4 days. physical Therapy EVALUATION  Patient: Ck Johnston (81 y.o. male)  Date: 3/1/2018  Primary Diagnosis: PRIMARY LOCALIZED OSTEOARTHRITIS LEFT HIP  Primary localized osteoarthritis of left hip  Procedure(s) (LRB):  LEFT TOTAL HIP ARTHROPLASTY   (Left) 1 Day Post-Op   Precautions:   WBAT, Fall    ASSESSMENT :  Based on the objective data described below, the patient presents with decreased overall mobility with LLE weakness and decreased ROM after L HENRIETTA POD1. Patient was able to mobilize with walker with limited assistance after being up with OT. Due to decrease in BP with activity, he was returned to bed after session rather than remaining up in the chair. His gait is characterized by a very narrow RAMÍREZ with inability to move the LLE to neutral, often stepping on his slipper of the L foot with the R. Once in bed, worked on exercises to encourage L hip extension and external rotation to neutral and discussed importance of exercises and positioning in aiding recovery. Expect he will progress well and will be able to return home with HHPT and family support. We will order a walker for home use, as he does not yet have one at home. .    Patient will benefit from skilled intervention to address the above impairments.   Patients rehabilitation potential is considered to be Good  Factors which may influence rehabilitation potential include:   []         None noted  []         Mental ability/status  [x]         Medical condition  []         Home/family situation and support systems  []         Safety awareness  []         Pain tolerance/management  []         Other:      PLAN :  Recommendations and Planned Interventions:  [x]           Bed Mobility Training             []    Neuromuscular Re-Education  [x]           Transfer Training                   []    Orthotic/Prosthetic Training  [x]           Gait Training                         []    Modalities  [x]           Therapeutic Exercises           []    Edema Management/Control  [x]           Therapeutic Activities            [x]    Patient and Family Training/Education  []           Other (comment):    Frequency/Duration: Patient will be followed by physical therapy  twice daily to address goals. Discharge Recommendations: Home Health  Further Equipment Recommendations for Discharge: rolling walker     SUBJECTIVE:   Patient stated I feel alright, I guess. 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  INGUINAL HERNIA REPAIR    CARDIAC SURG PROCEDURE UNLIST      CARDIAC STENTS    HX BACK SURGERY      LUMBAR    HX CATARACT REMOVAL Bilateral     HX KNEE REPLACEMENT      PARTIAL KNEE REPLACEMENT    VASCULAR SURGERY PROCEDURE UNLIST Right 2015    CAROTID ENDARTERECTOMY     Prior Level of Function/Home Situation: independent, but limited by pain  Personal factors and/or comorbidities impacting plan of care: L hip flexion contracture, L HENRIETTA with pain and weakness associated, heart failure    Home Situation  Home Environment: Private residence  # Steps to Enter: 13  One/Two Story Residence: One story (built on stilts)  Living Alone: Yes  Support Systems: Family member(s), Friends \ neighbors  Patient Expects to be Discharged to[de-identified] Private residence  Current DME Used/Available at Home: Madi Cheers, straight, Walker    EXAMINATION/PRESENTATION/DECISION MAKING:   Critical Behavior:              Hearing: Auditory  Auditory Impairment: Hard of hearing, bilateral  Skin:  Dressing C/D/I  Edema: none noted  Range Of Motion:  AROM: Generally decreased, functional (LLE limited in hip ext, ext rot, abd)                       Strength:    Strength: Generally decreased, functional (LLE weak in hip ext, ext rot, abd)                    Tone & Sensation:   Tone: Normal              Sensation: Intact               Coordination:  Coordination: Within functional limits  Vision:      Functional Mobility:  Bed Mobility:        Sit to Supine: Modified independent     Transfers:  Sit to Stand: Modified independent; Adaptive equipment; Additional time  Stand to Sit: Modified independent; Adaptive equipment; Additional time                       Balance:   Sitting: Intact; Without support  Standing: Intact; With support  Ambulation/Gait Training:  Distance (ft): 100 Feet (ft)  Assistive Device: Gait belt;Walker, rolling  Ambulation - Level of Assistance: Contact guard assistance; Adaptive equipment; Additional time        Gait Abnormalities: Antalgic;Decreased step clearance (crosses L foot over midline, chronic)  Right Side Weight Bearing: Full  Left Side Weight Bearing: As tolerated  Base of Support: Narrowed  Stance: Left decreased  Speed/Rosa: Pace decreased (<100 feet/min)  Step Length: Left shortened;Right shortened  Swing Pattern: Left asymmetrical     Interventions: Safety awareness training; Tactile cues; Verbal cues; Visual/Demos            Stairs:               Therapeutic Exercises:   Glut and quad sets, hip abd with assist, hamstring set    Functional Measure:  Tinetti test:    Sitting Balance: 1  Arises: 1  Attempts to Rise: 2  Immediate Standing Balance: 1  Standing Balance: 1  Nudged: 1  Eyes Closed: 1  Turn 360 Degrees - Continuous/Discontinuous: 1  Turn 360 Degrees - Steady/Unsteady: 1  Sitting Down: 1  Balance Score: 11  Indication of Gait: 1  R Step Length/Height: 1  L Step Length/Height: 1  R Foot Clearance: 1  L Foot Clearance: 1  Step Symmetry: 0  Step Continuity: 1  Path: 1  Trunk: 0  Walking Time: 0  Gait Score: 7  Total Score: 18       Tinetti Test and G-code impairment scale:  Percentage of Impairment CH    0%   CI    1-19% CJ    20-39% CK    40-59% CL    60-79% CM    80-99% CN     100%   Tinetti  Score 0-28 28 23-27 17-22 12-16 6-11 1-5 0       Tinetti Tool Score Risk of Falls  <19 = High Fall Risk  19-24 = Moderate Fall Risk  25-28 = Low Fall Risk  Tinetti ME. Performance-Oriented Assessment of Mobility Problems in Elderly Patients. [facility]; [id number]. (Scoring Description: PT Bulletin Feb. 10, 1993)    Older adults: Berenice Andrade et al, 2009; n = 1000 [facility] elderly evaluated with ABC, VIJAY, ADL, and IADL)  · Mean VIJAY score for males aged 69-68 years = 26.21(3.40)  · Mean VIJAY score for females age 69-68 years = 25.16(4.30)  · Mean VIJAY score for males over 80 years = 23.29(6.02)  · Mean VIJAY score for females over 80 years = 17.20(8.32)       G codes: In compliance with CMSs Claims Based Outcome Reporting, the following G-code set was chosen for this patient based on their primary functional limitation being treated: The outcome measure chosen to determine the severity of the functional limitation was the Tinetti with a score of 18/28 which was correlated with the impairment scale.     ? Mobility - Walking and Moving Around:     - CURRENT STATUS: CJ - 20%-39% impaired, limited or restricted    - GOAL STATUS: CI - 1%-19% impaired, limited or restricted    - D/C STATUS:  ---------------To be determined--------------- Physical Therapy Evaluation Charge Determination   History Examination Presentation Decision-Making   MEDIUM  Complexity : 1-2 comorbidities / personal factors will impact the outcome/ POC  MEDIUM Complexity : 3 Standardized tests and measures addressing body structure, function, activity limitation and / or participation in recreation  LOW Complexity : Stable, uncomplicated  LOW Complexity : FOTO score of       Based on the above components, the patient evaluation is determined to be of the following complexity level: LOW     Pain:  Pain Scale 1: Numeric (0 - 10)  Pain Intensity 1: 0              Activity Tolerance:   Fair  Please refer to the flowsheet for vital signs taken during this treatment. After treatment:   []         Patient left in no apparent distress sitting up in chair  [x]         Patient left in no apparent distress in bed  [x]         Call bell left within reach  [x]         Nursing notified  []         Caregiver present  []         Bed alarm activated    COMMUNICATION/EDUCATION:   The patients plan of care was discussed with: Occupational Therapist and Registered Nurse. [x]         Fall prevention education was provided and the patient/caregiver indicated understanding. [x]         Patient/family have participated as able in goal setting and plan of care. [x]         Patient/family agree to work toward stated goals and plan of care. []         Patient understands intent and goals of therapy, but is neutral about his/her participation. []         Patient is unable to participate in goal setting and plan of care.     Thank you for this referral.  Lis Riley, PT   Time Calculation: 21 mins

## 2018-03-01 NOTE — PROGRESS NOTES
Problem: Mobility Impaired (Adult and Pediatric)  Goal: *Acute Goals and Plan of Care (Insert Text)  Physical Therapy Goals  Initiated 3/1/2018    1. Patient will move from supine to sit and sit to supine  in bed with modified independence within 4 days. 2. Patient will perform sit to stand with modified independence within 4 days. 3. Patient will ambulate with modified independence for 300 feet with the least restrictive device within 4 days. 4. Patient will ascend/descend 5 stairs with 1 handrail(s) with modified independence within 4 days. 5. Patient will perform HENRIETTA home exercise program per protocol with modified independence within 4 days. physical Therapy TREATMENT  Patient: Keke Gary (89 y.o. male)  Date: 3/1/2018  Diagnosis: PRIMARY LOCALIZED OSTEOARTHRITIS LEFT HIP  Primary localized osteoarthritis of left hip <principal problem not specified>  Procedure(s) (LRB):  LEFT TOTAL HIP ARTHROPLASTY   (Left) 1 Day Post-Op  Precautions: Fall, WBAT  Chart, physical therapy assessment, plan of care and goals were reviewed. ASSESSMENT:  Pt remains agreeable to participate w/ PT. Pt continues to progress towards goals. Able to get to EOB from supine w/ Mod I-Supervision; required bed to be slightly elevated w/sit>stand transfer. Pt also required minimal cuing this session for proper hand placement as pt placing left hand on handle  or RW and right hand on lower horizontal bar RW. Pt continues to demonstrate flexed posoture at hips w/standing; encouraged hip extension using tactile cues at sacrum and instructed glut squeeze. Pt required tactile cuing at left hip and shoulder for upright posture during gait; verbal cues ( min) also provided for keeping RW close to RAMÍREZ. Pt returned to sitting in chair at sessions end w/ all items in reach. Encouraged pt to sit no longer than 1hr and to call for NSG assist when ready to get back to bed or for additional needs-pt verbalized understanding.    Progression toward goals:  [x]      Improving appropriately and progressing toward goals  []      Improving slowly and progressing toward goals  []      Not making progress toward goals and plan of care will be adjusted     PLAN:  Patient continues to benefit from skilled intervention to address the above impairments. Continue treatment per established plan of care. Discharge Recommendations:  Home Health  Further Equipment Recommendations for Discharge:  Rolling walker     SUBJECTIVE:   Patient stated It's just sore.  referring to hip \"pain\". Reported 0/10 pain prior to mobility    OBJECTIVE DATA SUMMARY:   Critical Behavior:  Neurologic State: Alert  Orientation Level: Oriented X4  Cognition: Appropriate for age attention/concentration  Safety/Judgement: Good awareness of safety precautions  Functional Mobility Training:  Bed Mobility:     Supine to Sit: Modified independent;Supervision  Sit to Supine:  (pt returned to chair)  Scooting: Supervision (to EOB)         Transfers:  Sit to Stand: Contact guard assistance; Additional time;Assist x1 (bed slightly elevated)  Stand to Sit: Contact guard assistance;Assist x1        Bed to Chair: Supervision                    Balance:  Sitting: Intact  Standing: Intact; With support  Ambulation/Gait Training:  Distance (ft): 150 Feet (ft)  Assistive Device: Gait belt;Walker, rolling  Ambulation - Level of Assistance: Contact guard assistance;Assist x1        Gait Abnormalities: Antalgic;Decreased step clearance; Other (left toe severely inverted (\"pigeon toed\")  Right Side Weight Bearing: Full  Left Side Weight Bearing: As tolerated  Base of Support: Narrowed  Stance: Left decreased  Speed/Rosa: Pace decreased (<100 feet/min)  Step Length: Left shortened;Right shortened  Swing Pattern: Left asymmetrical     Interventions: Safety awareness training; Tactile cues; Verbal cues; Visual/Demos                    Therapeutic Exercises:   SUPINE  EXERCISES   Sets   Reps   Active Active Assist Passive Self ROM   Comments   Ankle Pumps   []                                  []                                  []                                  []                                     Quad Sets  10 [x]                                  []                                  []                                  []                                     Heel Slides  10 [x]                                  []                                  []                                  []                                     Hip Abduction  10 [x]                                  []                                  []                                  []                                     Hip External Rotation   []                                  []                                  []                                  []                                     Glut Sets  10 [x]                                  []                                  []                                  []                                        []                                  []                                  []                                  []                                        []                                  []                                  []                                  []                                       STANDING  EXERCISES   Sets   Reps   Active Active Assist   Passive Self ROM   Comments   Heel Raises 2 10 [x]                                  []                                  []                                  []                                     Hip Abduction   []                                  []                                  []                                  []                                     Hip External Rotation   []                                  []                                  []                                  []                                     Hip Flexor Stretch   [] []                                  []                                  []                                     Mini squats   []                                  []                                  []                                  []                                     Hamstring Curl   []                                  []                                  []                                  []                                       Pain:  Pain Scale 1: Numeric (0 - 10)  Pain Intensity 1: 0              Activity Tolerance:   Good. Pre-activity BP: 122/39 (sitting EOB) HR 80; post activity BP: 163/62 HR 80  Please refer to the flowsheet for vital signs taken during this treatment.   After treatment:   [x] Patient left in no apparent distress sitting up in chair  [] Patient left in no apparent distress in bed  [x] Call bell left within reach  [x] Nursing notified  [] Caregiver present  [] Bed alarm activated    COMMUNICATION/COLLABORATION:   The patients plan of care was discussed with: Physical Therapist and Registered Nurse    Liat Skinner PTA   Time Calculation: 19 mins

## 2018-03-01 NOTE — ANESTHESIA POSTPROCEDURE EVALUATION
Post-Anesthesia Evaluation and Assessment    Patient: Shelley Durand MRN: [id number]  SSN: [id number]    YOB: 1940  Age: 76 y.o. Sex: male       Cardiovascular Function/Vital Signs  Visit Vitals    /52 (BP 1 Location: Right arm, BP Patient Position: At rest)    Pulse 64    Temp 37 °C (98.6 °F)    Resp 18    Ht 5' 11\" (1.803 m)    Wt 72.6 kg (160 lb)    SpO2 98%    BMI 22.32 kg/m2       Patient is status post spinal anesthesia for Procedure(s):  LEFT TOTAL HIP ARTHROPLASTY  . Nausea/Vomiting: None    Postoperative hydration reviewed and adequate. Pain:  Pain Scale 1: Numeric (0 - 10) (03/01/18 0806)  Pain Intensity 1: 0 (03/01/18 0806)   Managed    Neurological Status:   Neuro (WDL): Within Defined Limits (02/28/18 1324)  Neuro  LLE Motor Response: Purposeful (02/28/18 1800)  RLE Motor Response: Purposeful (02/28/18 1800)   At baseline    Mental Status and Level of Consciousness: Arousable    Pulmonary Status:   O2 Device: Room air (03/01/18 0806)   Adequate oxygenation and airway patent    Complications related to anesthesia: None    Post-anesthesia assessment completed.  No concerns    Signed By: Nichol Recio MD     March 1, 2018

## 2018-03-01 NOTE — OP NOTES
[facility] OP NOTE    Faina Dotson.  MR#: [id number]  : 1940  ACCOUNT #: [de-identified]   DATE OF SERVICE: 2018    SURGEON:  Leticia Henderson MD.     FIRST ASSISTANT:  Corene Runner, SA. PREOPERATIVE DIAGNOSIS:  Severe osteoarthritis, left hip. POSTOPERATIVE DIAGNOSIS:  Severe osteoarthritis, left hip. PROCEDURE PERFORMED:  Left total hip arthroplasty. COMPONENTS IMPLANTED:  DePuy 58 mm [place] acetabular shell with 36 mm inside diameter neutral AltrX polyethylene liner, size 7 standard offset [place] cementless femoral stem with 36 mm +5 ceramic femoral head. ANESTHESIA:  Spinal with sedation. COMPLICATIONS:  None. ESTIMATED BLOOD LOSS:  250 mL. SPECIMENS REMOVED:  None. INDICATIONS:  The patient is a 18-year-old gentleman with progressive debilitating left hip and groin pain due to severe erosive osteoarthritis. Symptoms have progressed despite comprehensive conservative treatment and presents for left total hip replacement. Risks, benefits, alternatives of procedure were reviewed with him in detail and he desires to proceed. PROCEDURE IN DETAIL:  The patient was taken to the operating room where the anesthesia team placed Spinal.  Preoperative IV antibiotics were administered. Hogan catheter was inserted and he was turned to right lateral decubitus position on a Carranza frame hip positioner. All bony prominences are well padded and an axillary roll was placed. Left hip and thigh were prepped and draped in the usual sterile fashion. Through a lateral hip incision, I performed a posterior approach to the left hip. Short rotators and posterior capsule reflected posteriorly. Leg lengths were checked on the table for comparison with trial reduction later during the procedure. The hip was dislocated and femoral neck osteotomy was made. Acetabular retractors were placed.   Proximal labrum was completely detached and was excised. The entire posterior labrum was calcified and there was a very large inferior osteophyte all of which was debulked with an osteotome. Acetabulum was reamed with hemispherical reamers up to 57 mm before impacting a 58 mm [place] acetabular shell. Intrinsic stability was satisfactory, but was augmented with one cancellous screw 36 neutral trial liner was placed. Femur was prepared with [place] conical reamers and broaches up to size 7 before achieving rotational stability. Calcar was planed based on native anatomy. The hip was reduced with a standard offset neck trial 36+5 trial lengthened the left leg, but was still a few millimeters short; however, with his preoperative contracture and shortening soft tissues were extremely tight, especially the anterior capsule. The entire anterior capsule was released and the iliopsoas tendon was preserved. The tendon itself was actually quite tight as well, but I was able to extend the hip to neutral and the hip was stable to neutral extension and full external rotation was nowhere near impinging posteriorly. Hip was stable to straight hyperflexion and with the hip flexed 90 degrees in neutral abduction, there is greater than 60-70 degrees of internal rotation. Trials were removed. Wound was copiously irrigated by pulse lavage before the real components were implanted. Same leg length and stability were achieved. Periarticular soft tissues were injected with a solution containing Exparel, 0.5% Marcaine with epinephrine as well as morphine and Toradol. Posterior capsule, despite preoperative contracture, was capable of being repaired. The capsule was repaired to the inner aspect of the posterior greater trochanter through drill holes using #2 Ethibond sutures. Deep fascia was closed with a combination of heavy Vicryl sutures and a running #2 Stratafix suture.   Skin and subcutaneous layers were closed in layered fashion with Vicryl and a running Monocryl subcuticular stitch. Wound was dressed with Dermabond and an Aquacel occlusive dressing. The patient was transported to the postanesthesia care unit in stable condition. All counts were correct at the end of the procedure.       MD David Ocampo / Michael.Ariane  D: 02/28/2018 22:38     T: 03/01/2018 11:03  JOB #: [id number]  CC: Brian Gregorio MD

## 2018-03-01 NOTE — PROGRESS NOTES
Primary Nurse Salvatore Thomas RN and Lacho Tapia RN performed a dual skin assessment on this patient No impairment noted  Keaton score is 20

## 2018-03-01 NOTE — PROGRESS NOTES
No pain this morning. No cp/sob.     Visit Vitals    /63 (BP Patient Position: Post activity;Supine)    Pulse 67    Temp 98.6 °F (37 °C)    Resp 18    Ht 5' 11\" (1.803 m)    Wt 72.6 kg (160 lb)    SpO2 98%    BMI 22.32 kg/m2       Alert  abd soft NT  L hip dressing dry  Calves soft NT  Motor 5/5  Pulses symmetrical    Recent Results (from the past 12 hour(s))   METABOLIC PANEL, BASIC    Collection Time: 03/01/18  3:51 AM   Result Value Ref Range    Sodium 141 136 - 145 mmol/L    Potassium 4.3 3.5 - 5.1 mmol/L    Chloride 109 (H) 97 - 108 mmol/L    CO2 24 21 - 32 mmol/L    Anion gap 8 5 - 15 mmol/L    Glucose 131 (H) 65 - 100 mg/dL    BUN 18 6 - 20 MG/DL    Creatinine 0.73 0.70 - 1.30 MG/DL    BUN/Creatinine ratio 25 (H) 12 - 20      GFR est AA >60 >60 ml/min/1.73m2    GFR est non-AA >60 >60 ml/min/1.73m2    Calcium 8.4 (L) 8.5 - 10.1 MG/DL   HEMOGLOBIN    Collection Time: 03/01/18  3:51 AM   Result Value Ref Range    HGB 8.9 (L) 12.1 - 17.0 g/dL       POD 1 L HENRIETTA; acute postop blood loss anemia (expected)  -pain control  -ASA and plavix  -PT  -home tomorrow    Cristina Mejia MD

## 2018-03-01 NOTE — PROGRESS NOTES
Bedside and Verbal shift change report given to Amira Jim (oncoming nurse) by Allyne Kawasaki, RN (offgoing nurse). Report given with Lisa OLIVERA and MAR.

## 2018-03-01 NOTE — PROGRESS NOTES
Bedside and Verbal shift change report given to Leticia Salgado RN (oncoming nurse) by Manny Stiles RN (offgoing nurse). Report included the following information SBAR, Kardex, OR Summary, Procedure Summary, Intake/Output, MAR and Recent Results.

## 2018-03-01 NOTE — PROGRESS NOTES
NUTRITION COMPLETE ASSESSMENT    RECOMMENDATIONS:   1. Continue current diet, Supplements (specify)  2. Please document po intake in flow sheets  3. Weekly weights     Interventions/Plan:   Food/Nutrient Delivery:           RD to add supplements, snacks    Assessment:   Reason for Assessment:   [x]BPA/MST Referral     Diet: Regular  Supplements: None  Nutritionally Significant Medications: [x] Reviewed & Includes: Miralax, Pericolace, [email or web address]   Meal Intake: No data found. Pre-Hospitalization:  Usual Appetite: Fair    Current Hospitalization:   Fluid Restriction:  none  Appetite: Good  PO Ability: Independent Average po intake:%  Average supplements intake:  n/a      Subjective:  \"I have felt weak for a long time so I haven't been eating as much. I think that is why I lost weight. \"    Objective:  Pt seen for MST. Pt admitted for osteoarthitis L hip. PMHx: arthritis, gout. Reviewed chart, spoke with pt. Pt POD 1 hip replacement. Pt states not eating as well (fatigue, weakness) for past 6-8 months; notes wt loss of ~30 lbs (16%), which is severe in nature. Pt states current appetite good; discussed adding supplement (likes chocolate) for wound healing; pt agreeable; offered snack, pt accepted. Ensure BID provides 700 kcal, 40g protein meeting 35% caloric, 46% protein needs if 100% consumed. Patient meets criteria for Severe Protein Calorie Malnutrition as evidenced by:   ASPEN Malnutrition Criteria  Acute Illness, Chronic Illness, or Social/Enviornmental: Chronic illness  Energy Intake: Less than/equal to 75% of est energy req for greater than/equal to 1 month  Weight Loss: 10% x 6 mos  Body Fat: Mild  Muscle Mass: Mild  ASPEN Malnutrition Score - Chronic Illness: 9  Chronic Illness - Malnutrition Diagnosis: Severe malnutrition. Noted elevated blood sugars; per EHR no diabetes. RD to follow po intake meals/supplements, wt status.      Estimated Nutrition Needs:   Kcals/day: 2006 Kcals/day (2751-5909 kcal/day (MSJ xc 1.2-1.3 +250 repletion))  Protein: 87 g (87-94g/day (1.2-1.3g/kg))  Fluid: 2100 ml (1mL/kcal)  Based On: [place] [address] [facility]  Weight Used: Actual wt    Pt expected to meet estimated nutrient needs:  []   Yes     []  No [x] Unable to predict at this time    Nutrition Diagnosis:   1. Unintended weight loss related to unknown etiology (decreased appeite, weakness PTA?) as evidenced by pt with stated 30 lb wt loss in past 6-8 months    2. Increased nutrient needs (protein)related to hip replacement as evidenced by pt POD 1 hip surgery    Goals:      Pt will consume at least 75% meals, supplements, snacks over next 5-7 days     Monitoring & Evaluation:    - Total energy intake, Liquid meal replacement   - Weight/weight change   -      Previous Nutrition Goals Met:   N/A  Previous Recommendations:    N/A    Education & Discharge Needs:   [x] None Identified   [] Identified and addressed    [] Participated in care plan, discharge planning, and/or interdisciplinary rounds        Cultural, Oriental orthodox and ethnic food preferences identified: None    Skin Integrity: []Intact  [x]Other- incision L hip  Edema: [x]None []Other  Last BM: 3/26/2018  Food Allergies: [x]None []Other  Diet Restrictions: Cultural/Hoahaoism Preference(s): None     Anthropometrics:    Weight Loss Metrics 2/28/2018 2/22/2018   Today's Wt 160 lb 158 lb   BMI 22.32 kg/m2 22.04 kg/m2         Height: 5' 11\" (180.3 cm),    Body mass index is 22.32 kg/(m^2).    ,     ,      Labs:    Lab Results   Component Value Date/Time    Sodium 141 03/01/2018 03:51 AM    Potassium 4.3 03/01/2018 03:51 AM    Chloride 109 (H) 03/01/2018 03:51 AM    CO2 24 03/01/2018 03:51 AM    Glucose 131 (H) 03/01/2018 03:51 AM    BUN 18 03/01/2018 03:51 AM    Creatinine 0.73 03/01/2018 03:51 AM    Calcium 8.4 (L) 03/01/2018 03:51 AM     No results found for: HBA1C, HGBE8, EZB9YBMU, EDZ5YHJJ      [facility] Meals, RD

## 2018-03-01 NOTE — PROGRESS NOTES
Problem: Self Care Deficits Care Plan (Adult)  Goal: *Acute Goals and Plan of Care (Insert Text)  Occupational Therapy Goals  Initiated: 3/1/2018    1. Patient will perform grooming with supervision/set-up standing at sink within 3 day(s). 2.  Patient will perform bathing with supervision/set-up from chair within 3 day(s). 3.  Patient will perform upper body dressing and lower body dressing with supervision/set-up within 3 day(s). 4.  Patient will perform toilet transfers with supervision/set-up within 3 day(s). 5.  Patient will perform all aspects of toileting with supervision/set-up within 3 day(s). 6.  Patient will be independent with hip precautions within 3 days. Occupational Therapy EVALUATION  Patient: Lalit Cohen (70 y.o. male)  Date: 3/1/2018  Primary Diagnosis: PRIMARY LOCALIZED OSTEOARTHRITIS LEFT HIP  Primary localized osteoarthritis of left hip  Procedure(s) (LRB):  LEFT TOTAL HIP ARTHROPLASTY   (Left) 1 Day Post-Op   Precautions:   Fall, WBAT    ASSESSMENT :  Based on the objective data described below, the patient presents with decreased independence with self care and functional mobility following admission for L THR. Pt was able to progress OOB to chair and noted fluctuation of BP with activity. Pt did report dizziness with sitting EOB that resolved with extra time. Pt denied dizziness with standing and was able to progress to chair. BP dropped again but continued to deny dizziness. Pt's weight assessed by stepping on scale 73.9 kg. Pt remained in chair with PT present at end of session to continue to monitor activity tolerance and progression of tasks. Patient will benefit from skilled intervention to address the above impairments.   Patients rehabilitation potential is considered to be Fair  Factors which may influence rehabilitation potential include:   []             None noted  []             Mental ability/status  []             Medical condition  [] Home/family situation and support systems  []             Safety awareness  [x]             Pain tolerance/management  []             Other:      PLAN :  Recommendations and Planned Interventions:  [x]               Self Care Training                  [x]        Therapeutic Activities  [x]               Functional Mobility Training    []        Cognitive Retraining  [x]               Therapeutic Exercises           [x]        Endurance Activities  [x]               Balance Training                   []        Neuromuscular Re-Education  []               Visual/Perceptual Training     [x]   Home Safety Training  [x]               Patient Education                 [x]        Family Training/Education  []               Other (comment):    Frequency/Duration: Patient will be followed by occupational therapy 5 times a week to address goals. Discharge Recommendations: None  Further Equipment Recommendations for Discharge: none     SUBJECTIVE:   Patient stated I am feeling ready to get moving. 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 INGUINAL HERNIA REPAIR    CARDIAC SURG PROCEDURE UNLIST      CARDIAC STENTS    HX BACK SURGERY      LUMBAR    HX CATARACT REMOVAL Bilateral     HX KNEE REPLACEMENT      PARTIAL KNEE REPLACEMENT    VASCULAR SURGERY PROCEDURE UNLIST Right 2015    CAROTID ENDARTERECTOMY       Prior Level of Function/Environment/Context: pt was independent at home prior to surgery.    Expanded or extensive additional review of patient history:     Home Situation  Home Environment: Private residence  # Steps to Enter: 13  One/Two Story Residence: One story  Living Alone: Yes  Support Systems: Family member(s)  Patient Expects to be Discharged to[de-identified] Private residence  Current DME Used/Available at Home: Cane, straight  Tub or Shower Type: Shower  [x]  Right hand dominant   []  Left hand dominant    EXAMINATION OF PERFORMANCE DEFICITS:  Cognitive/Behavioral Status:  Neurologic State: Alert  Orientation Level: Oriented X4  Cognition: Appropriate for age attention/concentration  Perception: Appears intact  Perseveration: No perseveration noted  Safety/Judgement: Good awareness of safety precautions    Skin: see nursing notes    Edema: none noted    Hearing: Auditory  Auditory Impairment: None    Vision/Perceptual:                           Acuity: Within Defined Limits         Range of Motion:    AROM: Within functional limits  PROM: Within functional limits                      Strength:    Strength: Within functional limits                Coordination:  Coordination: Within functional limits  Fine Motor Skills-Upper: Right Intact; Left Intact    Gross Motor Skills-Upper: Right Intact; Left Intact    Tone & Sensation:    Tone: Normal  Sensation: Intact                      Balance:  Sitting: Intact  Standing: Intact; With support    Functional Mobility and Transfers for ADLs:  Bed Mobility:  Supine to Sit: Minimum assistance  Sit to Supine:  (remained in chair with PT present)    Transfers:  Sit to Stand: Supervision  Stand to Sit: Supervision  Bed to Chair: Supervision  Toilet Transfer : Supervision    ADL Assessment:  Feeding: Supervision    Oral Facial Hygiene/Grooming: Supervision    Bathing: Minimum assistance    Upper Body Dressing: Supervision    Lower Body Dressing: Minimum assistance    Toileting: Supervision                ADL Intervention and task modifications:   Pt progress with OOB to chair with cues and instructions for tasks. Pt did well with all activities.             Cognitive Retraining  Safety/Judgement: Good awareness of safety precautions    Functional Measure:  Barthel Index:    Bathin  Bladder: 10  Bowels: 10  Groomin  Dressin  Feeding: 10  Mobility: 5  Stairs: 0  Toilet Use: 5  Transfer (Bed to Chair and Back): 10  Total: 60       Barthel and G-code impairment scale:  Percentage of impairment CH  0% CI  1-19% CJ  20-39% CK  40-59% CL  60-79% CM  80-99% CN  100%   Barthel Score 0-100 100 99-80 79-60 59-40 20-39 1-19   0   Barthel Score 0-20 20 17-19 13-16 9-12 5-8 1-4 0      The Barthel ADL Index: Guidelines  1. The index should be used as a record of what a patient does, not as a record of what a patient could do. 2. The main aim is to establish degree of independence from any help, physical or verbal, however minor and for whatever reason. 3. The need for supervision renders the patient not independent. 4. A patient's performance should be established using the best available evidence. Asking the patient, friends/relatives and nurses are the usual sources, but direct observation and common sense are also important. However direct testing is not needed. 5. Usually the patient's performance over the preceding 24-48 hours is important, but occasionally longer periods will be relevant. 6. Middle categories imply that the patient supplies over 50 per cent of the effort. 7. Use of aids to be independent is allowed. Lakeshia Bobo., Barthel, D.W. (1605). Functional evaluation: the Barthel Index. [address] [facility] (14)2. Jose Cantu mehdi LORENZO Chávez, Christine Kimball., Corey FernandezOrlando Health [facility], [address] [place] [address] (). Measuring the change indisability after inpatient rehabilitation; comparison of the responsiveness of the Barthel Index and Functional [place] Measure. Journal of Neurology, Neurosurgery, and Psychiatry, 66(4), 240-934. Talib Guzman NCUCA.TARA, CEE Ruffin, & Sulema Cooper M.A. (2004.) Assessment of post-stroke quality of life in cost-effectiveness studies: The usefulness of the Barthel Index and the EuroQoL-5D.  Quality of Life Research, 13, 427-43     G codes: In compliance with CMSs Claims Based Outcome Reporting, the following G-code set was chosen for this patient based on their primary functional limitation being treated: The outcome measure chosen to determine the severity of the functional limitation was the Barthel Index with a score of 60/100 which was correlated with the impairment scale. ? Self Care:     - CURRENT STATUS: CJ - 20%-39% impaired, limited or restricted    - GOAL STATUS: CI - 1%-19% impaired, limited or restricted    - D/C STATUS:  ---------------To be determined---------------     Occupational Therapy Evaluation Charge Determination   History Examination Decision-Making   LOW Complexity : Brief history review  HIGH Complexity : 5 or more performance deficits relating to physical, cognitive , or psychosocial skils that result in activity limitations and / or participation restrictions HIGH Complexity : Patient presents with comorbidities that affect occupational performance. Signifigant modification of tasks or assistance (eg, physical or verbal) with assessment (s) is necessary to enable patient to complete evaluation       Based on the above components, the patient evaluation is determined to be of the following complexity level: LOW   Pain:  Pain Scale 1: Numeric (0 - 10)  Pain Intensity 1: 0              Activity Tolerance:   Patient Vitals for the past 4 hrs:   Pulse BP   03/01/18 1032 67 143/63   03/01/18 1016 64sitting 111/57   03/01/18 1012 65sitting post activity 97/46   03/01/18 1005 68standing  121/60   03/01/18 1002 66sitting  113/50   03/01/18 0955 63  supine 133/62         Please refer to the flowsheet for vital signs taken during this treatment.   After treatment:   [x] Patient left in no apparent distress sitting up in chair  [] Patient left in no apparent distress in bed  [x] Call bell left within reach  [x] Nursing notified  [] Caregiver present  [] Bed alarm activated    COMMUNICATION/EDUCATION:   The patients plan of care was discussed with: Physical Therapist and Registered Nurse. [x] Home safety education was provided and the patient/caregiver indicated understanding. [] Patient/family have participated as able in goal setting and plan of care. [x] Patient/family agree to work toward stated goals and plan of care. [] Patient understands intent and goals of therapy, but is neutral about his/her participation. [] Patient is unable to participate in goal setting and plan of care. This patients plan of care is appropriate for delegation to [facility].     Thank you for this referral.  Barbara Patient, OT  Time Calculation: 35 mins

## 2018-03-01 NOTE — PROGRESS NOTES
Chart reviewed. CM met with patient at bedside to introduce role of CM and discuss discharge planning. Patient lives alone (physical address is [address] [facility], [address]), however patient will be staying with his friend Joanne Liu at discharge. Holden's address is [address] [place] [address],[address], [address]. Patient ambulated with a cane and walker PTA. Noted orders for [facility]. Offered freedom of choice, patient prefers [facility]. CM sent referral via [employer]. Family will transport patient home via car at discharge. CM will be available if additional needs arise. CM called [facility], they requested referral be faxed to [phone]. CM sent fax. Care Management Interventions  PCP Verified by CM: Yes  Mode of Transport at Discharge:  Other (see comment) (family)  Transition of Care Consult (CM Consult): [address]: No  Reason Outside [facility]: Out of service area  Mallory Jasso: No  Discharge Durable Medical Equipment: No  Physical Therapy Consult: Yes  Occupational Therapy Consult: Yes  Speech Therapy Consult: No  Current Support Network: Own Home, Lives Alone, Family Lives Nearby  Confirm Follow Up Transport: Family  Plan discussed with Pt/Family/Caregiver: Yes  Freedom of Choice Offered: Yes  Discharge Location  Discharge Placement: Home with home health     Roland Shearer BSW/CRM

## 2018-03-01 NOTE — PROGRESS NOTES
02/28/18 1903 02/28/18 1904 02/28/18 1917   Vitals   /56 132/60 146/61   MAP (Calculated) 83 84 89   BP Patient Position Supine;Pre-activity Sitting;Pre-activity Post activity;Supine       Patient ambulated into the hallway with RN assistance. Vital signs stable. Pt was returned back to bed. Time ambulated 3 min.

## 2018-03-02 VITALS
WEIGHT: 158.95 LBS | TEMPERATURE: 98.9 F | HEIGHT: 71 IN | OXYGEN SATURATION: 97 % | RESPIRATION RATE: 16 BRPM | BODY MASS INDEX: 22.25 KG/M2 | DIASTOLIC BLOOD PRESSURE: 56 MMHG | HEART RATE: 63 BPM | SYSTOLIC BLOOD PRESSURE: 143 MMHG

## 2018-03-02 LAB — HGB BLD-MCNC: 7.8 G/DL (ref 12.1–17)

## 2018-03-02 PROCEDURE — 74011250637 HC RX REV CODE- 250/637: Performed by: ORTHOPAEDIC SURGERY

## 2018-03-02 PROCEDURE — 85018 HEMOGLOBIN: CPT | Performed by: ORTHOPAEDIC SURGERY

## 2018-03-02 PROCEDURE — 97535 SELF CARE MNGMENT TRAINING: CPT

## 2018-03-02 PROCEDURE — 97530 THERAPEUTIC ACTIVITIES: CPT

## 2018-03-02 PROCEDURE — 36415 COLL VENOUS BLD VENIPUNCTURE: CPT | Performed by: ORTHOPAEDIC SURGERY

## 2018-03-02 PROCEDURE — 97116 GAIT TRAINING THERAPY: CPT

## 2018-03-02 RX ORDER — ACETAMINOPHEN 500 MG
500 TABLET ORAL
Qty: 60 TAB | Refills: 0 | Status: SHIPPED
Start: 2018-03-02

## 2018-03-02 RX ORDER — ASPIRIN 325 MG
325 TABLET, DELAYED RELEASE (ENTERIC COATED) ORAL 2 TIMES DAILY
Qty: 60 TAB | Refills: 0 | Status: SHIPPED | OUTPATIENT
Start: 2018-03-02

## 2018-03-02 RX ORDER — HYDROCODONE BITARTRATE AND ACETAMINOPHEN 5; 325 MG/1; MG/1
0.5 TABLET ORAL
Qty: 8 TAB | Refills: 0 | Status: SHIPPED | OUTPATIENT
Start: 2018-03-02

## 2018-03-02 RX ADMIN — ACETAMINOPHEN 500 MG: 500 TABLET, FILM COATED ORAL at 12:10

## 2018-03-02 RX ADMIN — Medication 10 ML: at 07:08

## 2018-03-02 RX ADMIN — POLYETHYLENE GLYCOL 3350 17 G: 17 POWDER, FOR SOLUTION ORAL at 09:00

## 2018-03-02 RX ADMIN — CLOPIDOGREL BISULFATE 75 MG: 75 TABLET ORAL at 09:00

## 2018-03-02 RX ADMIN — TAMSULOSIN HYDROCHLORIDE 0.4 MG: 0.4 CAPSULE ORAL at 09:00

## 2018-03-02 RX ADMIN — DOCUSATE SODIUM AND SENNOSIDES 1 TABLET: 8.6; 5 TABLET, FILM COATED ORAL at 09:00

## 2018-03-02 RX ADMIN — ACETAMINOPHEN 500 MG: 500 TABLET, FILM COATED ORAL at 07:08

## 2018-03-02 RX ADMIN — ALLOPURINOL 300 MG: 100 TABLET ORAL at 09:00

## 2018-03-02 RX ADMIN — ACETAMINOPHEN 500 MG: 500 TABLET, FILM COATED ORAL at 00:39

## 2018-03-02 RX ADMIN — AMLODIPINE BESYLATE 5 MG: 5 TABLET ORAL at 09:00

## 2018-03-02 RX ADMIN — ASPIRIN 325 MG: 325 TABLET, DELAYED RELEASE ORAL at 08:59

## 2018-03-02 NOTE — PROGRESS NOTES
NP ORTHO PROGRESS NOTE    Admit date: 2/28/2018  Date of Surgery: 2/28/2018   Procedures: Procedure(s):  LEFT TOTAL HIP ARTHROPLASTY    Admitting Physician: Karlee Reyes MD   Surgeon: Leigh Quiroz) and Role:     * Karlee Reyes MD - Primary    Chart/Meds/Labs Reviewed  Current Facility-Administered Medications   Medication Dose Route Frequency    HYDROcodone-acetaminophen (NORCO)  mg tablet 0.5 Tab  0.5 Tab Oral Q4H PRN    allopurinol (ZYLOPRIM) tablet 300 mg  300 mg Oral DAILY    amLODIPine (NORVASC) tablet 5 mg  5 mg Oral DAILY    clopidogrel (PLAVIX) tablet 75 mg  75 mg Oral DAILY    pravastatin (PRAVACHOL) tablet 20 mg  20 mg Oral QHS    tamsulosin (FLOMAX) capsule 0.4 mg  0.4 mg Oral DAILY    sodium chloride 0.9 % bolus infusion 500 mL  500 mL IntraVENous ONCE PRN    sodium chloride (NS) flush 5-10 mL  5-10 mL IntraVENous Q8H    sodium chloride (NS) flush 5-10 mL  5-10 mL IntraVENous PRN    acetaminophen (TYLENOL) tablet 500 mg  500 mg Oral Q4H    naloxone (NARCAN) injection 0.4 mg  0.4 mg IntraVENous PRN    hydrOXYzine HCl (ATARAX) tablet 10 mg  10 mg Oral Q8H PRN    senna-docusate (PERICOLACE) 8.6-50 mg per tablet 1 Tab  1 Tab Oral BID    polyethylene glycol (MIRALAX) packet 17 g  17 g Oral DAILY    bisacodyl (DULCOLAX) suppository 10 mg  10 mg Rectal DAILY PRN    aspirin delayed-release tablet 325 mg  325 mg Oral BID       Subjective:    Complaints: None---no pain much better! Ready for home  Denies Dizziness, CP, SOB, N/V, Abdominal pain, numbness or tingling of extremities. Able to perform ankle pumps easily. Progressing with mobility. Incisional pain control: None, rare use of Norco.  Pain Control:   Pain Assessment  Pain Scale 1: Numeric (0 - 10)  Pain Intensity 1: 0  Pain Location 1: Hip  Pain Orientation 1: Left  Pain Description 1: Aching  Pain Intervention(s) 1: Medication (see MAR), Ice    Oral diet:  Tolerating diet well    Objective:  General: Alert,Ox4, cooperative, NAD  HEENT: Atraumatic, PERRL, anicteric sclerae  Lungs: Bilateral expansion. Equal excursion. No accessory muscle use. Gastrointestinal:  Soft, non-tender, non-distended  Extremities:  Neurovasc exam WDL. + DP pulses. Sensation intact to light touch. Motor: + DF/PF          Calves non-tender upon palpation or with passive stretch. No significant erythema or swelling. Dressing: clean, dry and intact.     Vital Signs:   Visit Vitals    /56    Pulse 63    Temp 98.9 °F (37.2 °C)    Resp 16    Ht 5' 11\" (1.803 m)    Wt 72.1 kg (158 lb 15.2 oz)    SpO2 97%    BMI 22.17 kg/m2    O2 Flow Rate (L/min): 2 l/min O2 Device: Room air   Patient Vitals for the past 24 hrs:   BP Temp Pulse Resp SpO2 Weight   18 0823 143/56 98.9 °F (37.2 °C) 63 16 97 % -   18 0717 - - - - - 72.1 kg (158 lb 15.2 oz)   18 0409 126/50 - 65 - 95 % -   18 0339 113/44 99.3 °F (37.4 °C) 65 18 97 % -   18 2042 114/60 99.2 °F (37.3 °C) 64 16 97 % -   18 1426 126/74 97.9 °F (36.6 °C) 74 16 98 % -   18 1412 137/54 98.9 °F (37.2 °C) 63 16 98 % -     Temp (24hrs), Av.8 °F (37.1 °C), Min:97.9 °F (36.6 °C), Max:99.3 °F (37.4 °C)      Intake/output-last 8 hours:        Intake/output- 24 hours:   1901 -  0700  In: -   Out: 9119 [Urine:1650]    LAB:   Recent Results (from the past 24 hour(s))   HEMOGLOBIN    Collection Time: 18  2:23 AM   Result Value Ref Range    HGB 7.8 (L) 12.1 - 17.0 g/dL      No results found for: INR  Lab Results   Component Value Date/Time    HGB 7.8 (L) 2018 02:23 AM    HGB 8.9 (L) 2018 03:51 AM         PT/OT:   Gait:  Gait  Base of Support: Narrowed  Speed/Rosa: Pace decreased (<100 feet/min)  Step Length: Left shortened, Right shortened  Swing Pattern: Left asymmetrical  Stance: Left decreased  Gait Abnormalities: Decreased step clearance, Other (chronic inversion of left foot)  Ambulation - Level of Assistance: Stand-by assistance, Additional time, Assist x1  Distance (ft): 200 Feet (ft)  Assistive Device: Gait belt, Walker, rolling  Rail Use: Both  Stairs - Level of Assistance: Contact guard assistance  Number of Stairs Trained: 8 (4 steps x2)  Interventions: Safety awareness training, Tactile cues, Verbal cues, Visual/Demos            Interventions: Safety awareness training, Tactile cues, Verbal cues, Visual/Demos    PATIENT MOBILITY  Bed Mobility Training  Supine to Sit: Supervision, Additional time  Sit to Supine:  (returned to chair after gait)  Scooting: Modified independent  Transfer Training  Sit to Stand: Contact guard assistance  Stand to Sit: Contact guard assistance, Additional time, Assist x1  Bed to Chair: Supervision      Gait Training  Assistive Device: Gait belt, Walker, rolling  Ambulation - Level of Assistance: Stand-by assistance, Additional time, Assist x1  Distance (ft): 200 Feet (ft)  Stairs - Level of Assistance: Contact guard assistance  Number of Stairs Trained: 8 (4 steps x2)  Rail Use: Both  Interventions: Safety awareness training, Tactile cues, Verbal cues, Visual/Demos   Weight Bearing Status  Right Side Weight Bearing: Full  Left Side Weight Bearing: As tolerated        Assessment and Plan    Active Problems:    Primary localized osteoarthritis of left hip (2/28/2018)          POD#2 Procedure(s):  LEFT TOTAL HIP ARTHROPLASTY    Expected acute blood loss anemia related to surgery. Labs trending as expected. VSSAF. No indications of any adverse events. VTE prophylaxis: ASA & Plavix, Mobilization, Ankle pumping exercises  Weight bearing:  WBAT  Pain management:  Multi-modal pain plan, see above for medication,  Ice packs & elevation of extremity per orders, active gentle ROM & mobilize frequently for short periods of time. PT &OT  Ready for home  Wound benign. Neurovascularly intact. Progressing well. No indications of concerns.  Continue present plans per orthopedic attending surgeon & interdisciplinary team for joint replacement. Discharge Planning: Home today with home care services.   Plans per Dr. Carlos Resendiz    Signed By: Jm Fish NP    RN, MSN, MA, Adult NP-BC

## 2018-03-02 NOTE — PROGRESS NOTES
Chart reviewed. [phone] and spoke with Emilee. They are able to accept patient and can start care on Monday.     Estela Avendaño, LIUW/CRM

## 2018-03-02 NOTE — PROGRESS NOTES
Problem: Mobility Impaired (Adult and Pediatric)  Goal: *Acute Goals and Plan of Care (Insert Text)  Physical Therapy Goals  Initiated 3/1/2018    1. Patient will move from supine to sit and sit to supine  in bed with modified independence within 4 days. 2. Patient will perform sit to stand with modified independence within 4 days. 3. Patient will ambulate with modified independence for 300 feet with the least restrictive device within 4 days. 4. Patient will ascend/descend 5 stairs with 1 handrail(s) with modified independence within 4 days. 5. Patient will perform [place] home exercise program per protocol with modified independence within 4 days. physical Therapy TREATMENT  Patient: Dusty Calzada (50 y.o. male)  Date: 3/2/2018  Diagnosis: PRIMARY LOCALIZED OSTEOARTHRITIS LEFT HIP  Primary localized osteoarthritis of left hip <principal problem not specified>  Procedure(s) (LRB):  LEFT TOTAL HIP ARTHROPLASTY   (Left) 2 Days Post-Op  Precautions: Fall, WBAT  Chart, physical therapy assessment, plan of care and goals were reviewed. ASSESSMENT:   Pt completed stair straining this AM using both rails w/ CGA. Completed 4 steps x2, focusing on proper ascend/descend technique. Pt amb approx 200 FT total this morning w/step through reciprocal gait despite inverted left LE(pt also amb w/shoes on to minimize stepping on left foot/toe). Noted minimal hip/knee flexion w/ swing phase;continues to amb w/ flexed posture despite cues (pt reported amb w/flexed posture for approx last 20yrs). Pt continues to need minimal reinforcement w/proper hand placement when performing sit>stand from slightly elevated bed. Overall, pt progressing well towards goals and cleared from PT standpoint for d/c w/ Home Health. Reviewed activity frequency once returned home along w/ exercises and icing frequency.    Progression toward goals:  [x]      Improving appropriately and progressing toward goals  []      Improving slowly and progressing toward goals  []      Not making progress toward goals and plan of care will be adjusted     PLAN:  Patient continues to benefit from skilled intervention to address the above impairments. Continue treatment per established plan of care. Discharge Recommendations:  Home Health  Further Equipment Recommendations for Discharge:  rolling walker (ordered, awaiting delivery)     SUBJECTIVE:   Patient stated I've walked like this for close to 20 years.     OBJECTIVE DATA SUMMARY:   Critical Behavior:  Neurologic State: Alert  Orientation Level: Oriented X4  Cognition: Appropriate safety awareness  Safety/Judgement: Good awareness of safety precautions  Functional Mobility Training:  Bed Mobility:     Supine to Sit: Supervision; Additional time  Sit to Supine:  (returned to chair after gait)  Scooting: Modified independent         Transfers:  Sit to Stand: Contact guard assistance; Additional time;Assist x1 (bed slightly elevated)  Stand to Sit: Contact guard assistance; Additional time;Assist x1                             Balance:  Sitting: Intact  Standing: Intact; With support  Ambulation/Gait Training:  Distance (ft): 200 Feet (ft)  Assistive Device: Gait belt;Walker, rolling  Ambulation - Level of Assistance: Stand-by assistance; Additional time;Assist x1        Gait Abnormalities: Decreased step clearance; Other (chronic inversion of left foot)  Right Side Weight Bearing: Full  Left Side Weight Bearing: As tolerated  Base of Support: Narrowed  Stance: Left decreased  Speed/Rosa: Pace decreased (<100 feet/min)  Step Length: Left shortened;Right shortened  Swing Pattern: Left asymmetrical                 Stairs:  Number of Stairs Trained: 8 (4 steps x2)  Stairs - Level of Assistance: Contact guard assistance  Rail Use: Both  Therapeutic Exercises:   SUPINE  EXERCISES   Sets   Reps   Active Active Assist   Passive Self ROM   Comments   Ankle Pumps  10 [x]                                  [] []                                  []                                     Quad Sets  10 [x]                                  []                                  []                                  []                                     Heel Slides  10 [x]                                  []                                  []                                  []                                     Hip Abduction  15 [x]                                  []                                  []                                  []                                     Hip External Rotation   []                                  []                                  []                                  []                                     Glut Sets   []                                  []                                  []                                  []                                        []                                  []                                  []                                  []                                        []                                  []                                  []                                  []                                       STANDING  EXERCISES   Sets   Reps   Active Active Assist   Passive Self ROM   Comments   Heel Raises  10 [x]                                  []                                  []                                  []                                     Hip Abduction   []                                  []                                  []                                  []                                     Hip External Rotation   []                                  []                                  []                                  []                                     Hip Flexor Stretch   []                                  []                                  []                                  [] Mini squats   []                                  []                                  []                                  []                                     Hamstring Curl   []                                  []                                  []                                  []                                       Pain:  Pain Scale 1: Numeric (0 - 10)  Pain Intensity 1: 0              Activity Tolerance:   Good  Please refer to the flowsheet for vital signs taken during this treatment.   After treatment:   [x] Patient left in no apparent distress sitting up in chair  [] Patient left in no apparent distress in bed  [x] Call bell left within reach  [x] Nursing notified  [x] Caregiver present  [] Bed alarm activated    COMMUNICATION/COLLABORATION:   The patients plan of care was discussed with: Registered Nurse    Cayetano PACHECO Means,PTA   Time Calculation: 26 mins

## 2018-03-02 NOTE — PROGRESS NOTES
Problem: Self Care Deficits Care Plan (Adult)  Goal: *Acute Goals and Plan of Care (Insert Text)  Occupational Therapy Goals  Initiated: 3/1/2018    1. Patient will perform grooming with supervision/set-up standing at sink within 3 day(s). 2.  Patient will perform bathing with supervision/set-up from chair within 3 day(s). 3.  Patient will perform upper body dressing and lower body dressing with supervision/set-up within 3 day(s). 4.  Patient will perform toilet transfers with supervision/set-up within 3 day(s). 5.  Patient will perform all aspects of toileting with supervision/set-up within 3 day(s). 6.  Patient will be independent with hip precautions within 3 days. Occupational Therapy TREATMENT  Patient: Jacquie Reyes (85 y.o. male)  Date: 3/2/2018  Diagnosis: PRIMARY LOCALIZED OSTEOARTHRITIS LEFT HIP  Primary localized osteoarthritis of left hip <principal problem not specified>  Procedure(s) (LRB):  LEFT TOTAL HIP ARTHROPLASTY   (Left) 2 Days Post-Op  Precautions: Fall, WBAT  Chart, occupational therapy assessment, plan of care, and goals were reviewed. ASSESSMENT:  Nursing cleared for therapy. Patient seen for dressing training with friend, Joanne Liu, present. Patient plans to [facility] to [facility] a few days at discharge. Provide education on posterior precautions secondary to patient's impulsivity, impaired carry over and L foot internally rotated at times. Patient owns sock aide and reacher. Threading with reacher with supervision, standing aspect with min A at RW level, and socks with supervision with verbal cues. Joanne Liu active in assisting. Transfer training bed > chair with CGA with verbal cues for sequencing of sock aide. Recommend  OT to maximize carry over/indep and 24 hour supervision. His friend Joanne Liu is driving him and patient to stay at [facility] as needed.      Progression toward goals:  []          Improving appropriately and progressing toward goals  [x] Improving slowly and progressing toward goals  []          Not making progress toward goals and plan of care will be adjusted     PLAN:  Patient continues to benefit from skilled intervention to address the above impairments. Continue treatment per established plan of care. Discharge Recommendations:  [facility]  Further Equipment Recommendations for Discharge:  Has all needed DME     SUBJECTIVE:   Patient stated I need to get out of here.   The patient stated 3/3 hip precautions. Reviewed all 3 with patient. OBJECTIVE DATA SUMMARY:   Cognitive/Behavioral Status:  Neurologic State: Alert, Appropriate for age  Orientation Level: Oriented X4  Cognition: Appropriate for age attention/concentration, Appropriate safety awareness, Follows commands  Safety/Judgement: Good awareness of safety precautions  Functional Mobility and Transfers for ADLs:  Bed Mobility:     Supine to Sit: Supervision; Additional time  Sit to Supine:  (returned to chair after gait)  Scooting: Modified independent    Transfers:  Sit to Stand: Contact guard assistance       Balance:  Sitting: Intact  Standing: Impaired  Standing - Static: Good  Standing - Dynamic : Fair (mild LOB standing for pulling up pants- CGA to correct robert)  ADL Intervention and Instruction:                      Upper Body Dressing Assistance  Dressing Assistance: Supervision/set-up  Pullover Shirt: Supervision/set-up  Front Opened Shirt: Supervision/set-up    Lower Body Dressing Assistance  Underpants: Contact guard assistance  Pants With Button/Zipper: Contact guard assistance  Socks: Supervision/set-up; Compensatory technique training (sock aide LLE)            Bathing: Patient instructed when bathing to not submerge wound in water, stand to shower or sponge bathe, cover wound with plastic and tape to ensure no water reaches bandage/wound without cues. Patient indicated understanding.   Dressing joint: Patient instructed to don/doff LLE first/last.  Patient instructed to don all clothing while sitting prior to standing, doff all clothing to knees while standing, then sit to doff clothing off from knees to feet in order to facilitate fall prevention, pain management, and energy conservation with minimal supervision for sitting aspect and CGA for standing aspects. Rosa Rios active in participating. Patient indicated understanding/recalled strategies with minimal cues. Home safety: Patient instructed on home modifications and safety (raise height of ADL objects, appropriate height of chair surfaces, recliner safety, change of floor surfaces, clear pathways) to increase independence and fall prevention with supervision. Patient indicated understanding. Pain:  Pain Scale 1: Numeric (0 - 10)  Pain Intensity 1: 0              Activity Tolerance:   Good for Dressing ed. Seated/standing.      After treatment:   [x] Patient left in no apparent distress sitting up in chair  [] Patient left in no apparent distress in bed  [x] Call bell left within reach  [x] Nursing notified  [x] Caregiver present  [] Bed alarm activated    COMMUNICATION/COLLABORATION:   The patients plan of care was discussed with: Physical Therapy Assistant, Registered Nurse and Patient and friend, Young Eller OT  Time Calculation: 29 mins

## 2018-03-02 NOTE — PROGRESS NOTES
Bedside and Verbal shift change report given to Laxmi Fox RN (oncoming nurse) by Nba oPlanco RN (offgoing nurse). Report included the following information SBAR, Kardex, Procedure Summary, Intake/Output, MAR, Accordion and Recent Results.

## 2018-03-02 NOTE — PROGRESS NOTES
Bedside shift change report given to Anurag (oncoming nurse) by Sergey Chilel (offgoing nurse). Report included the following information SBAR, Kardex, Intake/Output and MAR.

## 2018-03-02 NOTE — DISCHARGE INSTRUCTIONS
Patient meets criteria for   BUNDLED PAYMENT   for Care Improvement Initiative Criteria    Contact Information for Orthopedic Nurse Navigator:      ANTIONETTE Richard, RN-BC  R:[phone]  C:[phone]  O:[phone]  After Hospital Care Plan:  Discharge Instructions Hip Replacement- Dr. Belem Lawrence    Patient Name: Lance Hernandez  Date of procedure: 2/28/2018   Procedure: Procedure(s):  LEFT TOTAL HIP ARTHROPLASTY    Surgeon: Boaz Alvarenga) and Role:     * Wendy Nicolas MD - Primary   PCP: Magui Fang MD  Date of discharge: No discharge date for patient encounter. Follow up appointments   Follow up with Dr. Belem Lawrence in 3 weeks. Call [phone] to make an appointment.  If home health has been arranged for you the agency will contact you to arrange dates/times for visits. Please call them if you do not hear from them within 24 hours after you are discharged    When to call your Orthopaedic Surgeon: Call [phone]. If you need to reach us after 5pm or on a weekend; call [phone] and the on call physician will be contacted   Increased hip pain, unrelieved pain or if you have difficulty or are unable to walk   Signs of infection-if your incision is red; continues to have drainage; drainage has a foul odor or if you have a persistent fever over 101 degrees   Signs of a blood clot in your leg-calf pain, tenderness, redness, swelling of lower leg    When to call your Primary Care Physician:   Concerns about medical conditions such as diabetes, high blood pressure, asthma, congestive heart failure   Call if blood sugars are elevated, persistent headache or dizziness, coughing or congestion, constipation or diarrhea, burning with urination, abnormal heart rate    When to call [address]skp go to the nearest emergency room   acute onset of chest pain, shortness of breath, difficulty breathing    Activity   Weight bearing as tolerated with walker or crutches.  Refer to pages 23-33 of your handbook for instructions and pictures   Complete your Home Exercise Program daily as instructed by your therapist.  Refer to pages 36-42 of your handbook for instructions and pictures   Get up every one hour and walk (except at night when sleeping)   AVOID sudden and extreme movement of your hip (surgical leg)   Do not drive or operate heavy machinery    Incision Care   The Aquacel (brown, waterproof) surgical dressing is to remain on your hip for 7 days. On the 7th day have someone gently peel the dressing off by carefully lifting the edge and stretching it slightly to break the adhesive seal   If you have steri-strips (small, white pieces of tape) on your incision, they may come off when you remove the Aquacel surgical dressing. This is okay. You may now leave your incision open to air   If your Aquacel dressing comes loose/off before the 7th day, you may replace it with a dry sterile gauze dressing; change it daily. Once your incision is not draining, you may leave it open to air   You may take a shower with the Aquacel dressing in place. Once the Aquacel is removed, you may shower and get your incision wet but do not submerge your incision under water in a bath tub, hot tub or swimming pool for 6 weeks after surgery. Preventing blood clots   Take Enteric coated Aspirin (delayed release) one tablet twice a day for one month following surgery    Pain management   Take pain medication as prescribed with food and fluids; decrease the amount you use as your pain lessens   Avoid alcoholic beverages while taking pain medication   Please be aware that many medications contain Tylenol. We do not want you to over medicate so please read the information below as a guide. Do not take more than 2.5 Grams of Tylenol in a 24 hour period.   (There are 1000 milligrams in one Gram)  o Tylenol 325 mg per tablet (do not take more than 8 tablets in 24 hours)  o Tylenol 500 mg per tablet (do not take more than 5 tablets in 24 hours)     You may place an ice bag on your hip for 15-20 minutes after exercising    Diet   Resume usual diet; drink plenty of fluids; eat foods high in fiber   You may want to take a stool softener (such as Senokot-S or Colace) to prevent constipation while you are taking pain medication. If constipation occurs, take a laxative (such as Dulcolax tablets, Milk of Magnesia, or a suppository)    [facility] Protocol (to be followed by [address])    Nursing-per physicians order   Complete head to toe assessment, vital signs   Medication reconciliation   Review pain management   Manage chronic medical conditions    Physical Therapy-per physicians order    Weight bearing status:  Precautions at Admission: Fall, WBAT  Left Side Weight Bearing: As tolerated  Right Side Weight Bearing: Full    Mobility Status:  Supine to Sit: Supervision, Additional time  Sit to Stand: Contact guard assistance, Additional time, Assist x1 (bed slightly elevated)  Sit to Supine:  (returned to chair after gait)  Bed to Chair: Supervision    Gait:  Distance (ft): 200 Feet (ft)  Ambulation - Level of Assistance: Stand-by assistance, Additional time, Assist x1  Assistive Device: Gait belt, Walker, rolling  Gait Abnormalities: Decreased step clearance, Other (chronic inversion of left foot)    ADL status overall composite: Toilet Transfer : Supervision    Physical Therapy   Assessment and evaluation-bed mobility; functional transfers (bed, chair, bathroom, stairs); ambulation with equipment, car transfers, safety and ability to get out of house in the event of an emergency   AVOID sudden and extreme movement of the hip (surgical leg)   Discuss pain management   Review how to do ADLs.   Refer to pages 43-47 of handbook    Home Exercise Program-refer to pages 36-42 of handbook

## 2018-03-05 NOTE — NURSE NAVIGATOR
[address]  SBAR Bundled Payment Handoff     FROM:                                TO: [facility]                                                      ([address] [place] [address] or Facility name)  [address]  [address] [address]  Dept: [address]: [phone]                                      Room#:  577/01                                                     Nurse Navigator:  Delmer Curran RN           SITUATION      ASAScore: ASA 2 - Patient with mild systemic disease with no functional limitations    Admitted:  2/28/2018  Hospital Day: 3      Attending Provider:  No att. providers found     Consultations:  None    PCP:  Magui Fang MD   None     Admitting Dx:  PRIMARY LOCALIZED OSTEOARTHRITIS LEFT HIP  Primary localized osteoarthritis of left hip       Active Problems:    Primary localized osteoarthritis of left hip (2/28/2018)      5 Days Post-Op of   Procedure(s):  LEFT TOTAL HIP ARTHROPLASTY     BY: Jac Henderson MD             ON: 2/28/2018                  Code Status: Prior             Advance Directive? 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  INGUINAL HERNIA REPAIR    CARDIAC SURG PROCEDURE UNLIST      CARDIAC STENTS    HX BACK SURGERY      LUMBAR    HX CATARACT REMOVAL Bilateral     HX KNEE REPLACEMENT      PARTIAL KNEE REPLACEMENT    VASCULAR SURGERY PROCEDURE UNLIST Right 2015    CAROTID ENDARTERECTOMY       Prior to Admission Medications Prescriptions Last Dose Informant Patient Reported? Taking?   allopurinol (ZYLOPRIM) 300 mg tablet 2018 at [address]  Yes Yes   Sig: Take 300 mg by mouth daily. amLODIPine (NORVASC) 5 mg tablet 2018 at unknown  Yes Yes   Sig: Take 5 mg by mouth daily. clopidogrel (PLAVIX) 75 mg tab 2018 at unknown  Yes No   Sig: Take 75 mg by mouth daily. cyanocobalamin (VITAMIN B12) 1,000 mcg/mL injection Not Taking at Unknown time  Yes No   Si,000 mcg by IntraMUSCular route every thirty (30) days. pravastatin (PRAVACHOL) 40 mg tablet 2018 at Unknown time  Yes Yes   Sig: Take 20 mg by mouth daily. tamsulosin (FLOMAX) 0.4 mg capsule 2018 at Unknown time  Yes Yes   Sig: Take 0.4 mg by mouth as needed. Facility-Administered Medications: None       Vaccinations: There is no immunization history on file for this patient. ASSESSMENT   Age: 76 y.o. Gender: male        Height: Height: 5' 11\" (180.3 cm)                    Weight:Weight: 72.1 kg (158 lb 15.2 oz)     No data found. Active Orders   There are no active orders of the following type(s): Diet.        Orientation: Orientation Level: Oriented X4    Active Lines/Drains:  (Peg Tube / Hogan / CL or S/L?):no    Urinary Status: Voiding      Last BM: Last Bowel Movement Date: 18     Skin Integrity: Incision (comment) (left hip)   Wound Hip Left;Lateral-DRESSING STATUS: Clean, dry, and intact    Wound Hip Left;Lateral-DRESSING TYPE: Silver products (aquacell)    Mobility: Slightly limited   Weight Bearing Status: WBAT (Weight Bearing as Tolerated)      Gait Training  Assistive Device: Gait belt, Walker, rolling  Ambulation - Level of Assistance: Stand-by assistance, Additional time, Assist x1  Distance (ft): 200 Feet (ft)  Stairs - Level of Assistance: Contact guard assistance  Number of Stairs Trained: 8 (4 steps x2)  Rail Use: Both  Interventions: Safety awareness training, Tactile cues, Verbal cues, Visual/Demos On Anticoagulation? YES  Aspirin 325 mg BID and Plavix 75 mg daily                                        Pain Medications given:  Hydrocodone                                   Lab Results   Component Value Date/Time    Glucose 131 (H) 03/01/2018 03:51 AM    HGB 7.8 (L) 03/02/2018 02:23 AM    HGB 8.9 (L) 03/01/2018 03:51 AM       Readmission Risks:  Score:         RECOMMENDATION     See After Visit Summary (AVS) for:  · Discharge instructions  · After [address]   · Medication Reconciliation          [facility] Orthopaedic Nurse Navigator  ANTIONETTE Mattson, RN-BC       Office  [phone]  Cell      [phone]  Fax      [phone]  [email or web address]             . Stu

## 2018-03-28 NOTE — DISCHARGE SUMMARY
@9TDRL@ [address] [place] [address]    DISCHARGE SUMMARY     Patient: Cb Brice                             Medical Record Number: [id number]                : 1940  Age: 76 y.o. Admit Date: 2018  Discharge Date: 3/2/2018  Admission Diagnosis: PRIMARY LOCALIZED OSTEOARTHRITIS LEFT HIP  Primary localized osteoarthritis of left hip  Discharge Diagnosis: PRIMARY LOCALIZED OSTEOARTHRITIS LEFT HIP  Procedures: Procedure(s):  LEFT TOTAL HIP ARTHROPLASTY    Surgeon: Yolanda Mccarthy MD  Anesthesia: spinal  Complications: None     History of Present Illness: The patient is a 70-year-old gentleman with progressive debilitating left hip and groin pain due to severe erosive osteoarthritis. Symptoms have progressed despite comprehensive conservative treatment and presents for left total hip replacement. Hospital Course:  Mr. Renata Higgins tolerated the procedure well. He was transferred  to the recovery room in stable condition. After a brief stay the patient was then transferred to the Joint Replacement Unit at [address] [place] [address].  On postoperative day #1, the dressing was clean and dry, he was neurovascularly intact. The patient was afebrile and vital signs were stable. Calves were soft and non-tender bilaterally. He made satisfactory progress with physical therapy and was discharged to Home in stable condition on postoperative day 2. He was provided with routine postoperative instructions and advised to follow up in my office in 3 weeks following discharge from the hospital.  He was prescribed aspirin for DVT prophylaxis and hydrocodone for post-operative pain. Discharge Medications:      ACETAMINOPHEN 500 mg Oral Q4HWA, Schedule for pain control over the next 7-14 days.  Do not exceed 2500 mg in 24 hours.  Obtain over the counter.         ALLOPURINOL 300 mg Oral DAILY        AMLODIPINE BESYLATE 5 mg Oral DAILY       ASPIRIN 325 mg Oral BID, Take either Enteric Coated or Delayed Release Aspirin.  May obtain over-the-counter.         CLOPIDOGREL BISULFATE 75 mg Oral DAILY        CYANOCOBALAMIN (VITAMIN B-12) 1,000 mcg IntraMUSCular Q30D       HYDROCODONE/ACETAMINOPHEN 5-325 mg 0.5 Tabs Oral Q12H PRN        PRAVASTATIN SODIUM 20 mg Oral DAILY        TAMSULOSIN HCL 0.4 mg Oral PRN         Signed by: Nick Gonzalez MD  3/28/2018

## (undated) DEVICE — TRAY CATH 16F URIN MTR LTX -- CONVERT TO ITEM 363111

## (undated) DEVICE — SUTURE VCRL SZ 0 L36IN ABSRB VLT L40MM CT 1/2 CIR J358H

## (undated) DEVICE — NEEDLE HYPO 21GA L1.5IN GRN POLYPR HUB S STL REG BVL STR

## (undated) DEVICE — SUTURE VCRL SZ 2-0 L27IN ABSRB UD L36MM CP-1 1/2 CIR REV J266H

## (undated) DEVICE — SUTURE ETHBND EXCEL SZ 2 L30IN NONABSORBABLE GRN L40MM V-37 MX69G

## (undated) DEVICE — SYRINGE MED 20ML STD CLR PLAS LUERLOCK TIP N CTRL DISP

## (undated) DEVICE — SOLUTION IRRIG 3000ML 0.9% SOD CHL FLX CONT 0797208] ICU MEDICAL INC]

## (undated) DEVICE — (D)SOL MEDC ALC ISO 70% 16OZ -- CONVERT TO ITEM 364515

## (undated) DEVICE — Device

## (undated) DEVICE — INFECTION CONTROL KIT SYS

## (undated) DEVICE — SUTURE MCRYL SZ 3-0 L27IN ABSRB UD L24MM PS-1 3/8 CIR PRIM Y936H

## (undated) DEVICE — SOLUTION IV 50ML 0.9% SOD CHL

## (undated) DEVICE — SUTURE VCRL SZ 1 L36IN ABSRB UD L36MM CT-1 1/2 CIR J947H

## (undated) DEVICE — 2108 SERIES SAGITTAL BLADE AGGRESSIVE  (25.0 X 1.19 X 85.0MM)

## (undated) DEVICE — 3M™ IOBAN™ 2 ANTIMICROBIAL INCISE DRAPE 6651EZ: Brand: IOBAN™ 2

## (undated) DEVICE — PAD 05IN BASE 3IN PEAK M DENS CONVOLUTED FOAM

## (undated) DEVICE — 3M™ DURAPORE™ SURGICAL TAPE 1538-3, 3 INCH X 10 YARD (7,5CM X 9,1M), 4 ROLLS/BOX: Brand: 3M™ DURAPORE™

## (undated) DEVICE — PATIENT PROTECTIVE PAD FOR IMP UNIVERSAL LATERAL HIP POSITIONER (ULP) (6/CASE): Brand: PATIENT PROTECTIVE PAD

## (undated) DEVICE — (D)PREP SKN CHLRAPRP APPL 26ML -- CONVERT TO ITEM 371833

## (undated) DEVICE — CONTAINER,SPECIMEN,3OZ,OR STRL: Brand: MEDLINE

## (undated) DEVICE — REM POLYHESIVE ADULT PATIENT RETURN ELECTRODE: Brand: VALLEYLAB

## (undated) DEVICE — 3M™ STERI-DRAPE™ 2 INCISE DRAPE 2050: Brand: STERI-DRAPE™

## (undated) DEVICE — BIT DRL L5IN DIA2MM STD ST S STL TWST BUSA

## (undated) DEVICE — DRSG AQUACEL SURG 3.5 X 10IN -- CONVERT TO ITEM 370183

## (undated) DEVICE — SOLUTION IRRIG 1000ML H2O STRL BLT

## (undated) DEVICE — DRAPE,U/ SHT,SPLIT,PLAS,STERIL: Brand: MEDLINE

## (undated) DEVICE — HEWSON SUTURE RETRIEVER: Brand: HEWSON SUTURE RETRIEVER

## (undated) DEVICE — PREP SKN PREVAIL 40ML APPL --

## (undated) DEVICE — DEVON™ KNEE AND BODY STRAP 60" X 3" (1.5 M X 7.6 CM): Brand: DEVON

## (undated) DEVICE — STERILE POLYISOPRENE POWDER-FREE SURGICAL GLOVES WITH EMOLLIENT COATING: Brand: PROTEXIS

## (undated) DEVICE — STERILE POLYISOPRENE POWDER-FREE SURGICAL GLOVES: Brand: PROTEXIS

## (undated) DEVICE — T4 HOOD

## (undated) DEVICE — HANDPIECE SET WITH BONE CLEANING TIP AND SUCTION TUBE: Brand: INTERPULSE

## (undated) DEVICE — GOWN,PREVENTION PLUS,XLN/2XL,ST,22/CS: Brand: MEDLINE

## (undated) DEVICE — DERMABOND SKIN ADH 0.7ML -- DERMABOND ADVANCED 12/BX

## (undated) DEVICE — COVER,MAYO STAND,STERILE: Brand: MEDLINE

## (undated) DEVICE — SCRUB DRY SURG EZ SCRUB BRUSH PREOPERATIVE GRN